# Patient Record
Sex: MALE | Race: ASIAN | Employment: FULL TIME | URBAN - METROPOLITAN AREA
[De-identification: names, ages, dates, MRNs, and addresses within clinical notes are randomized per-mention and may not be internally consistent; named-entity substitution may affect disease eponyms.]

---

## 2017-10-13 ENCOUNTER — ANESTHESIA EVENT (OUTPATIENT)
Dept: ENDOSCOPY | Age: 27
DRG: 378 | End: 2017-10-13
Payer: COMMERCIAL

## 2017-10-13 ENCOUNTER — HOSPITAL ENCOUNTER (INPATIENT)
Age: 27
LOS: 2 days | Discharge: HOME | DRG: 378 | End: 2017-10-15
Attending: EMERGENCY MEDICINE | Admitting: HOSPITALIST
Payer: COMMERCIAL

## 2017-10-13 ENCOUNTER — APPOINTMENT (OUTPATIENT)
Dept: GENERAL RADIOLOGY | Age: 27
DRG: 378 | End: 2017-10-13
Attending: EMERGENCY MEDICINE
Payer: COMMERCIAL

## 2017-10-13 ENCOUNTER — ANESTHESIA (OUTPATIENT)
Dept: ENDOSCOPY | Age: 27
DRG: 378 | End: 2017-10-13
Payer: COMMERCIAL

## 2017-10-13 DIAGNOSIS — K92.2 UPPER GI BLEED: Primary | ICD-10-CM

## 2017-10-13 PROBLEM — I10 HYPERTENSION: Status: ACTIVE | Noted: 2017-10-13

## 2017-10-13 PROBLEM — F99 PSYCHIATRIC ILLNESS: Status: ACTIVE | Noted: 2017-10-13

## 2017-10-13 LAB
ABO + RH BLD: NORMAL
ALBUMIN SERPL-MCNC: 4 G/DL (ref 3.4–5)
ALBUMIN/GLOB SERPL: 1.3 {RATIO} (ref 0.8–1.7)
ALP SERPL-CCNC: 65 U/L (ref 45–117)
ALT SERPL-CCNC: 57 U/L (ref 16–61)
ANION GAP BLD CALC-SCNC: 19 MMOL/L (ref 10–20)
ANION GAP SERPL CALC-SCNC: 11 MMOL/L (ref 3–18)
APTT PPP: 26.6 SEC (ref 23–36.4)
AST SERPL-CCNC: 15 U/L (ref 15–37)
BASOPHILS # BLD: 0.1 K/UL (ref 0–0.06)
BASOPHILS NFR BLD: 0 % (ref 0–2)
BILIRUB SERPL-MCNC: 0.6 MG/DL (ref 0.2–1)
BLOOD GROUP ANTIBODIES SERPL: NORMAL
BUN BLD-MCNC: 31 MG/DL (ref 7–18)
BUN SERPL-MCNC: 29 MG/DL (ref 7–18)
BUN/CREAT SERPL: 25 (ref 12–20)
CA-I BLD-MCNC: 1.12 MMOL/L (ref 1.12–1.32)
CALCIUM SERPL-MCNC: 8.5 MG/DL (ref 8.5–10.1)
CHLORIDE BLD-SCNC: 103 MMOL/L (ref 100–108)
CHLORIDE SERPL-SCNC: 102 MMOL/L (ref 100–108)
CK MB CFR SERPL CALC: NORMAL % (ref 0–4)
CK MB SERPL-MCNC: <1 NG/ML (ref 5–25)
CK SERPL-CCNC: 55 U/L (ref 39–308)
CO2 BLD-SCNC: 24 MMOL/L (ref 19–24)
CO2 SERPL-SCNC: 26 MMOL/L (ref 21–32)
CREAT SERPL-MCNC: 1.16 MG/DL (ref 0.6–1.3)
CREAT UR-MCNC: 1.2 MG/DL (ref 0.6–1.3)
DIFFERENTIAL METHOD BLD: ABNORMAL
EOSINOPHIL # BLD: 0.3 K/UL (ref 0–0.4)
EOSINOPHIL NFR BLD: 2 % (ref 0–5)
ERYTHROCYTE [DISTWIDTH] IN BLOOD BY AUTOMATED COUNT: 12.4 % (ref 11.6–14.5)
GLOBULIN SER CALC-MCNC: 3 G/DL (ref 2–4)
GLUCOSE BLD STRIP.AUTO-MCNC: 107 MG/DL (ref 74–106)
GLUCOSE SERPL-MCNC: 106 MG/DL (ref 74–99)
HCT VFR BLD AUTO: 31.7 % (ref 36–48)
HCT VFR BLD AUTO: 40.2 % (ref 36–48)
HCT VFR BLD CALC: 41 % (ref 36–49)
HGB BLD-MCNC: 10.4 G/DL (ref 13–16)
HGB BLD-MCNC: 13.5 G/DL (ref 13–16)
HGB BLD-MCNC: 13.9 G/DL (ref 12–16)
INR PPP: 1 (ref 0.8–1.2)
LYMPHOCYTES # BLD: 4.4 K/UL (ref 0.9–3.6)
LYMPHOCYTES NFR BLD: 27 % (ref 21–52)
MCH RBC QN AUTO: 29.3 PG (ref 24–34)
MCHC RBC AUTO-ENTMCNC: 33.6 G/DL (ref 31–37)
MCV RBC AUTO: 87.4 FL (ref 74–97)
MONOCYTES # BLD: 0.7 K/UL (ref 0.05–1.2)
MONOCYTES NFR BLD: 4 % (ref 3–10)
NEUTS SEG # BLD: 10.7 K/UL (ref 1.8–8)
NEUTS SEG NFR BLD: 67 % (ref 40–73)
PLATELET # BLD AUTO: 396 K/UL (ref 135–420)
PMV BLD AUTO: 9.5 FL (ref 9.2–11.8)
POTASSIUM BLD-SCNC: 5.4 MMOL/L (ref 3.5–5.5)
POTASSIUM SERPL-SCNC: 5.5 MMOL/L (ref 3.5–5.5)
PROT SERPL-MCNC: 7 G/DL (ref 6.4–8.2)
PROTHROMBIN TIME: 13.1 SEC (ref 11.5–15.2)
RBC # BLD AUTO: 4.6 M/UL (ref 4.7–5.5)
SODIUM BLD-SCNC: 140 MMOL/L (ref 136–145)
SODIUM SERPL-SCNC: 139 MMOL/L (ref 136–145)
SPECIMEN EXP DATE BLD: NORMAL
TROPONIN I SERPL-MCNC: <0.02 NG/ML (ref 0–0.04)
WBC # BLD AUTO: 16.1 K/UL (ref 4.6–13.2)

## 2017-10-13 PROCEDURE — 74011000258 HC RX REV CODE- 258

## 2017-10-13 PROCEDURE — 77030033269 HC SLV COMPR SCD KNE2 CARD -B

## 2017-10-13 PROCEDURE — 77030021678 HC GLIDESCP STAT DISP VERT -B: Performed by: ANESTHESIOLOGY

## 2017-10-13 PROCEDURE — 74011250636 HC RX REV CODE- 250/636

## 2017-10-13 PROCEDURE — 85730 THROMBOPLASTIN TIME PARTIAL: CPT | Performed by: EMERGENCY MEDICINE

## 2017-10-13 PROCEDURE — 85018 HEMOGLOBIN: CPT | Performed by: HOSPITALIST

## 2017-10-13 PROCEDURE — 36415 COLL VENOUS BLD VENIPUNCTURE: CPT | Performed by: HOSPITALIST

## 2017-10-13 PROCEDURE — 76060000032 HC ANESTHESIA 0.5 TO 1 HR: Performed by: INTERNAL MEDICINE

## 2017-10-13 PROCEDURE — 99285 EMERGENCY DEPT VISIT HI MDM: CPT

## 2017-10-13 PROCEDURE — 74011250636 HC RX REV CODE- 250/636: Performed by: HOSPITALIST

## 2017-10-13 PROCEDURE — 86900 BLOOD TYPING SEROLOGIC ABO: CPT | Performed by: EMERGENCY MEDICINE

## 2017-10-13 PROCEDURE — 80053 COMPREHEN METABOLIC PANEL: CPT | Performed by: EMERGENCY MEDICINE

## 2017-10-13 PROCEDURE — 77030008683 HC TU ET CUF COVD -A: Performed by: ANESTHESIOLOGY

## 2017-10-13 PROCEDURE — 74011250636 HC RX REV CODE- 250/636: Performed by: EMERGENCY MEDICINE

## 2017-10-13 PROCEDURE — 96365 THER/PROPH/DIAG IV INF INIT: CPT

## 2017-10-13 PROCEDURE — 85610 PROTHROMBIN TIME: CPT | Performed by: EMERGENCY MEDICINE

## 2017-10-13 PROCEDURE — 76040000007: Performed by: INTERNAL MEDICINE

## 2017-10-13 PROCEDURE — 71010 XR CHEST PORT: CPT

## 2017-10-13 PROCEDURE — 0W3P8ZZ CONTROL BLEEDING IN GASTROINTESTINAL TRACT, VIA NATURAL OR ARTIFICIAL OPENING ENDOSCOPIC: ICD-10-PCS | Performed by: INTERNAL MEDICINE

## 2017-10-13 PROCEDURE — C9113 INJ PANTOPRAZOLE SODIUM, VIA: HCPCS | Performed by: EMERGENCY MEDICINE

## 2017-10-13 PROCEDURE — 77030010936 HC CLP LIG BSC -C: Performed by: INTERNAL MEDICINE

## 2017-10-13 PROCEDURE — 85025 COMPLETE CBC W/AUTO DIFF WBC: CPT | Performed by: EMERGENCY MEDICINE

## 2017-10-13 PROCEDURE — 80047 BASIC METABLC PNL IONIZED CA: CPT

## 2017-10-13 PROCEDURE — 65660000001 HC RM ICU INTERMED STEPDOWN

## 2017-10-13 PROCEDURE — 82550 ASSAY OF CK (CPK): CPT | Performed by: EMERGENCY MEDICINE

## 2017-10-13 PROCEDURE — 96375 TX/PRO/DX INJ NEW DRUG ADDON: CPT

## 2017-10-13 PROCEDURE — 74011000258 HC RX REV CODE- 258: Performed by: EMERGENCY MEDICINE

## 2017-10-13 PROCEDURE — 74011000250 HC RX REV CODE- 250

## 2017-10-13 RX ORDER — SODIUM CHLORIDE, SODIUM LACTATE, POTASSIUM CHLORIDE, CALCIUM CHLORIDE 600; 310; 30; 20 MG/100ML; MG/100ML; MG/100ML; MG/100ML
50 INJECTION, SOLUTION INTRAVENOUS CONTINUOUS
Status: DISCONTINUED | OUTPATIENT
Start: 2017-10-13 | End: 2017-10-13 | Stop reason: HOSPADM

## 2017-10-13 RX ORDER — ONDANSETRON 2 MG/ML
4 INJECTION INTRAMUSCULAR; INTRAVENOUS
Status: COMPLETED | OUTPATIENT
Start: 2017-10-13 | End: 2017-10-13

## 2017-10-13 RX ORDER — ACETAMINOPHEN 325 MG/1
TABLET ORAL
COMMUNITY

## 2017-10-13 RX ORDER — PROPOFOL 10 MG/ML
INJECTION, EMULSION INTRAVENOUS AS NEEDED
Status: DISCONTINUED | OUTPATIENT
Start: 2017-10-13 | End: 2017-10-13 | Stop reason: HOSPADM

## 2017-10-13 RX ORDER — ONDANSETRON 2 MG/ML
4 INJECTION INTRAMUSCULAR; INTRAVENOUS ONCE
Status: DISCONTINUED | OUTPATIENT
Start: 2017-10-13 | End: 2017-10-13 | Stop reason: HOSPADM

## 2017-10-13 RX ORDER — LIDOCAINE HYDROCHLORIDE 20 MG/ML
INJECTION, SOLUTION EPIDURAL; INFILTRATION; INTRACAUDAL; PERINEURAL AS NEEDED
Status: DISCONTINUED | OUTPATIENT
Start: 2017-10-13 | End: 2017-10-13 | Stop reason: HOSPADM

## 2017-10-13 RX ORDER — FENTANYL CITRATE 50 UG/ML
50 INJECTION, SOLUTION INTRAMUSCULAR; INTRAVENOUS AS NEEDED
Status: DISCONTINUED | OUTPATIENT
Start: 2017-10-13 | End: 2017-10-13 | Stop reason: HOSPADM

## 2017-10-13 RX ORDER — SUCCINYLCHOLINE CHLORIDE 20 MG/ML
INJECTION INTRAMUSCULAR; INTRAVENOUS AS NEEDED
Status: DISCONTINUED | OUTPATIENT
Start: 2017-10-13 | End: 2017-10-13 | Stop reason: HOSPADM

## 2017-10-13 RX ORDER — DEXTROSE, SODIUM CHLORIDE, AND POTASSIUM CHLORIDE 5; .45; .15 G/100ML; G/100ML; G/100ML
150 INJECTION INTRAVENOUS CONTINUOUS
Status: DISCONTINUED | OUTPATIENT
Start: 2017-10-13 | End: 2017-10-14

## 2017-10-13 RX ORDER — FENTANYL CITRATE 50 UG/ML
INJECTION, SOLUTION INTRAMUSCULAR; INTRAVENOUS AS NEEDED
Status: DISCONTINUED | OUTPATIENT
Start: 2017-10-13 | End: 2017-10-13 | Stop reason: HOSPADM

## 2017-10-13 RX ORDER — SODIUM CHLORIDE 9 MG/ML
200 INJECTION, SOLUTION INTRAVENOUS CONTINUOUS
Status: DISCONTINUED | OUTPATIENT
Start: 2017-10-13 | End: 2017-10-14

## 2017-10-13 RX ORDER — ONDANSETRON 2 MG/ML
INJECTION INTRAMUSCULAR; INTRAVENOUS AS NEEDED
Status: DISCONTINUED | OUTPATIENT
Start: 2017-10-13 | End: 2017-10-13 | Stop reason: HOSPADM

## 2017-10-13 RX ORDER — PANTOPRAZOLE SODIUM 40 MG/10ML
80 INJECTION, POWDER, LYOPHILIZED, FOR SOLUTION INTRAVENOUS
Status: COMPLETED | OUTPATIENT
Start: 2017-10-13 | End: 2017-10-13

## 2017-10-13 RX ORDER — LAMOTRIGINE 100 MG/1
TABLET ORAL DAILY
COMMUNITY

## 2017-10-13 RX ORDER — SERTRALINE HYDROCHLORIDE 25 MG/1
TABLET, FILM COATED ORAL DAILY
COMMUNITY

## 2017-10-13 RX ORDER — LORAZEPAM 1 MG/1
TABLET ORAL
COMMUNITY

## 2017-10-13 RX ORDER — VENLAFAXINE HYDROCHLORIDE 75 MG/1
CAPSULE, EXTENDED RELEASE ORAL DAILY
COMMUNITY

## 2017-10-13 RX ORDER — BUPROPION HYDROCHLORIDE 75 MG/1
TABLET ORAL 2 TIMES DAILY
COMMUNITY

## 2017-10-13 RX ORDER — MIDAZOLAM HYDROCHLORIDE 1 MG/ML
INJECTION, SOLUTION INTRAMUSCULAR; INTRAVENOUS AS NEEDED
Status: DISCONTINUED | OUTPATIENT
Start: 2017-10-13 | End: 2017-10-13 | Stop reason: HOSPADM

## 2017-10-13 RX ADMIN — FENTANYL CITRATE 50 MCG: 50 INJECTION, SOLUTION INTRAMUSCULAR; INTRAVENOUS at 15:24

## 2017-10-13 RX ADMIN — SUCCINYLCHOLINE CHLORIDE 100 MG: 20 INJECTION INTRAMUSCULAR; INTRAVENOUS at 15:24

## 2017-10-13 RX ADMIN — LIDOCAINE HYDROCHLORIDE 40 MG: 20 INJECTION, SOLUTION EPIDURAL; INFILTRATION; INTRACAUDAL; PERINEURAL at 15:24

## 2017-10-13 RX ADMIN — MIDAZOLAM HYDROCHLORIDE 1 MG: 1 INJECTION, SOLUTION INTRAMUSCULAR; INTRAVENOUS at 15:24

## 2017-10-13 RX ADMIN — DEXTROSE MONOHYDRATE, SODIUM CHLORIDE, AND POTASSIUM CHLORIDE 150 ML/HR: 50; 4.5; 1.49 INJECTION, SOLUTION INTRAVENOUS at 12:29

## 2017-10-13 RX ADMIN — SODIUM CHLORIDE 2000 ML: 900 INJECTION, SOLUTION INTRAVENOUS at 08:31

## 2017-10-13 RX ADMIN — SODIUM CHLORIDE 200 ML/HR: 900 INJECTION, SOLUTION INTRAVENOUS at 09:14

## 2017-10-13 RX ADMIN — DEXTROSE MONOHYDRATE, SODIUM CHLORIDE, AND POTASSIUM CHLORIDE 150 ML/HR: 50; 4.5; 1.49 INJECTION, SOLUTION INTRAVENOUS at 21:10

## 2017-10-13 RX ADMIN — PROPOFOL 130 MG: 10 INJECTION, EMULSION INTRAVENOUS at 15:24

## 2017-10-13 RX ADMIN — ONDANSETRON 4 MG: 2 INJECTION INTRAMUSCULAR; INTRAVENOUS at 15:35

## 2017-10-13 RX ADMIN — SODIUM CHLORIDE 8 MG/HR: 900 INJECTION INTRAVENOUS at 18:18

## 2017-10-13 RX ADMIN — ONDANSETRON 4 MG: 2 INJECTION INTRAMUSCULAR; INTRAVENOUS at 08:32

## 2017-10-13 RX ADMIN — PANTOPRAZOLE SODIUM 80 MG: 40 INJECTION, POWDER, FOR SOLUTION INTRAVENOUS at 08:32

## 2017-10-13 RX ADMIN — SODIUM CHLORIDE 8 MG/HR: 900 INJECTION INTRAVENOUS at 08:37

## 2017-10-13 NOTE — ED NOTES
Verbal report given to [facility] in ICU, patient in endoscopy at this time

## 2017-10-13 NOTE — CONSULTS
History and Physical    Referring Physician: None    Consult Date: 10/13/2017       Assessment & Recommendations: Active Problems:    GI bleed (10/13/2017)      Upper GI bleeding - Continue H&H checks, NPO, pantoprazole. EGD today. Subjective:     Chief Complaint: Bloody vomiting    History of Present Illness: Kay Nation is a 32 y.o. male who is seen in consultation for blood in the vomitus. The patient presented to the ED with vomiting of blood. He denies alcohol intake, anticoagulation, history of liver disease, prior episodes, NSAIDs. Patient was noted to have an H&H of 13.5 and 40. Patient was started on pantoprazole and IVF fluids. He is NPO. Past Medical History:   Diagnosis Date    Psychiatric disorder     depression, bipolar      No past surgical history on file. No family history on file. Social History   Substance Use Topics    Smoking status: Never Smoker    Smokeless tobacco: Never Used    Alcohol use Not on file       Prior to Admission medications    Medication Sig Start Date End Date Taking? Authorizing Provider   sertraline (ZOLOFT) 25 mg tablet Take  by mouth daily. Yes Tabitha Hammer MD   BUSPIRONE HCL (BUSPAR PO) Take  by mouth. Yes Tabitha Hammer MD   lamoTRIgine (LAMICTAL) 100 mg tablet Take  by mouth daily. Yes Tabitha Hammer MD   venlafaxine-SR (EFFEXOR XR) 75 mg capsule Take  by mouth daily. Yes Tabitha Hammer MD   acetaminophen (TYLENOL) 325 mg tablet Take  by mouth every four (4) hours as needed for Pain. Yes Tabitha Hammer MD   buPROPion (WELLBUTRIN) 75 mg tablet Take  by mouth two (2) times a day. Yes Tabitha Hammer MD   LORazepam (ATIVAN) 1 mg tablet Take  by mouth every four (4) hours as needed for Anxiety. Yes Tabitha Hammer MD     Allergies   Allergen Reactions    Penicillins Rash        Review of Systems:  A detailed 10 organ review of systems is obtained with pertinent positives as listed in the History of Present Illness and Past Medical History.  All others are negative. Objective: Intake and Output:    10/13 0701 - 10/13 1900  In: 0128 [I.V.:2640]  Out: 325 [Urine:325]       Physical Exam:   General: NAD  Skin:  Extremities and face reveal no rashes. No yuan erythema. No telangiectasias on the chest wall. HEENT: Sclerae anicteric. No oral ulcers. No abnormal pigmentation of the lips. The neck is supple. Cardiovascular: Regular rate and rhythm. No murmurs, gallops, or rubs. PMI nondisplaced. Respiratory:  Comfortable breathing with no accessory muscle use. Clear breath sounds with no wheezes, rales, or rhonchi. GI:  Abdomen nondistended, soft, and nontender. Normal active bowel sounds. No enlargement of the liver or spleen. No masses palpable. Rectal:  Deferred  Musculoskeletal:  No pitting edema of the lower legs. Extremities have good range of motion. No costovertebral tenderness. Neurological:  Gross memory appears intact. Patient is alert and oriented. Psychiatric:  Mood appears appropriate with judgement intact. Data Review:   Recent Results (from the past 24 hour(s))   CBC WITH AUTOMATED DIFF    Collection Time: 10/13/17  8:20 AM   Result Value Ref Range    WBC 16.1 (H) 4.6 - 13.2 K/uL    RBC 4.60 (L) 4.70 - 5.50 M/uL    HGB 13.5 13.0 - 16.0 g/dL    HCT 40.2 36.0 - 48.0 %    MCV 87.4 74.0 - 97.0 FL    MCH 29.3 24.0 - 34.0 PG    MCHC 33.6 31.0 - 37.0 g/dL    RDW 12.4 11.6 - 14.5 %    PLATELET 135 719 - 306 K/uL    MPV 9.5 9.2 - 11.8 FL    NEUTROPHILS 67 40 - 73 %    LYMPHOCYTES 27 21 - 52 %    MONOCYTES 4 3 - 10 %    EOSINOPHILS 2 0 - 5 %    BASOPHILS 0 0 - 2 %    ABS. NEUTROPHILS 10.7 (H) 1.8 - 8.0 K/UL    ABS. LYMPHOCYTES 4.4 (H) 0.9 - 3.6 K/UL    ABS. MONOCYTES 0.7 0.05 - 1.2 K/UL    ABS. EOSINOPHILS 0.3 0.0 - 0.4 K/UL    ABS.  BASOPHILS 0.1 (H) 0.0 - 0.06 K/UL    DF AUTOMATED     METABOLIC PANEL, COMPREHENSIVE    Collection Time: 10/13/17  8:20 AM   Result Value Ref Range    Sodium 139 136 - 145 mmol/L    Potassium 5.5 3.5 - 5.5 mmol/L    Chloride 102 100 - 108 mmol/L    CO2 26 21 - 32 mmol/L    Anion gap 11 3.0 - 18 mmol/L    Glucose 106 (H) 74 - 99 mg/dL    BUN 29 (H) 7.0 - 18 MG/DL    Creatinine 1.16 0.6 - 1.3 MG/DL    BUN/Creatinine ratio 25 (H) 12 - 20      GFR est AA >60 >60 ml/min/1.73m2    GFR est non-AA >60 >60 ml/min/1.73m2    Calcium 8.5 8.5 - 10.1 MG/DL    Bilirubin, total 0.6 0.2 - 1.0 MG/DL    ALT (SGPT) 57 16 - 61 U/L    AST (SGOT) 15 15 - 37 U/L    Alk.  phosphatase 65 45 - 117 U/L    Protein, total 7.0 6.4 - 8.2 g/dL    Albumin 4.0 3.4 - 5.0 g/dL    Globulin 3.0 2.0 - 4.0 g/dL    A-G Ratio 1.3 0.8 - 1.7     CARDIAC PANEL,(CK, CKMB & TROPONIN)    Collection Time: 10/13/17  8:20 AM   Result Value Ref Range    CK 55 39 - 308 U/L    CK - MB <1.0 <3.6 ng/ml    CK-MB Index  0.0 - 4.0 %     CALCULATION NOT PERFORMED WHEN RESULT IS BELOW LINEAR LIMIT    Troponin-I, Qt. <0.02 0.0 - 0.045 NG/ML   PROTHROMBIN TIME + INR    Collection Time: 10/13/17  8:20 AM   Result Value Ref Range    Prothrombin time 13.1 11.5 - 15.2 sec    INR 1.0 0.8 - 1.2     PTT    Collection Time: 10/13/17  8:20 AM   Result Value Ref Range    aPTT 26.6 23.0 - 36.4 SEC   TYPE & SCREEN    Collection Time: 10/13/17  8:20 AM   Result Value Ref Range    Crossmatch Expiration 10/16/2017     ABO/Rh(D) Saumya Lara POSITIVE     Antibody screen NEG    POC CHEM8    Collection Time: 10/13/17  8:25 AM   Result Value Ref Range    CO2, POC 24 19 - 24 MMOL/L    Glucose,  (H) 74 - 106 MG/DL    BUN, POC 31 (H) 7 - 18 MG/DL    Creatinine, POC 1.2 0.6 - 1.3 MG/DL    GFRAA, POC >60 >60 ml/min/1.73m2    GFRNA, POC >60 >60 ml/min/1.73m2    Sodium,  136 - 145 MMOL/L    Potassium, POC 5.4 3.5 - 5.5 MMOL/L    Calcium, ionized (POC) 1.12 1.12 - 1.32 MMOL/L    Chloride,  100 - 108 MMOL/L    Anion gap, POC 19 10 - 20      Hematocrit, POC 41 36 - 49 %    Hemoglobin, POC 13.9 12 - 16 G/DL         Signed By: Kevin Palomo MD     October 13, 2017

## 2017-10-13 NOTE — ROUTINE PROCESS
Bedside and Verbal shift change report given to Esther oWo RN (oncoming nurse) by Flor Gupta RN (offgoing nurse).  Report included the following information SBAR, Kardex, Intake/Output, MAR, Recent Results and Cardiac Rhythm SR.

## 2017-10-13 NOTE — MED STUDENT NOTES
HPI: Sarah Rhodes is a 25yo previously healthy male who reports for sudden onset of gross hematemesis this morning. He states that last night, he couldn't sleep, was very hot, and felt somewhat nauseous. He awoke this morning with stomach pain, did not eat breakfast, and tried to use the rest room but could not. He had a near-syncopal episode and then began to have vomiting of bright red blood. He denies loss of consciousness, fever, or recent illness, and he has never had the same pain before. He takes multiple psychiatric medications but has no other medical problems. He endorses regular Tylenol use for frequent headaches, but denies Motrin or Aspirin use. His mother is at bedside and states that they do not even have ibuprofen in the house. There is no family history of PUD or ZES. He denies substance use. PSH: Salivary gland resection 2015  PMH: Depression, bipolar disorder    O:  T: 96.3F  P: 110  BP: 86/54  RR: 17  O2- 93%  General: Alert and oriented, in acute distress. Pale, diaphoretic, with bright red blood visible on feet bilaterally. Cardio: RRR with no murmurs, gallops, or rubs. Pulm: CTA in all fields  Abdomen: +BS, Soft, epigastric tenderness. Extremities: +DP pulses bilaterally    CXR: No acute abnormalities, with no free air visualized  POC Hg/Hct- 40.2/13.5  POC Glucose- 107  WBC- 16.1  CMP- within normal limits    Assessment:   Upper GI hemorrhage with hemorrhagic shock  Fluid resuscitation improved BP to 114/65 and pulse to 86, patient now stable and comfortable    Plan:  Continue IV fluid resuscitation and anti-emetics  Repeat H/H in 1 hour  GI consult for probable endoscopy  Monitor vital signs  *ATTENTION:  This note has been created by a medical student for educational purposes only. Please do not refer to the content of this note for clinical decision-making, billing, or other purposes. Please see attending physicians note to obtain clinical information on this patient. *

## 2017-10-13 NOTE — PROGRESS NOTES
1700-Received pt from PACU via stretcher, no sign of distress, vs stable, A+Ox4, calm and cooperative, denies pain and nausea, will continue to monitor  1800-Mother at bedside, pt seen by Dr. Rickey Hernandez

## 2017-10-13 NOTE — PROGRESS NOTES
Severe GE junction esophagitis with active oozing of blood. Circumferential ulceration. One of the ulcers had active oozing. Proceeded to deploy two clips with control of bleeding. Large amount of blood in the stomach with clots. Would admit for observation. Continue protonix drip. NPO. Serial H&H. Can advance diet in the AM if H&H is stable.

## 2017-10-13 NOTE — ACP (ADVANCE CARE PLANNING)
Patient has designated ___________mom_____________ to participate in his/her discharge plan and to receive any needed information.      Name: Rahel Hough  Address:  Phone [phone]

## 2017-10-13 NOTE — PROGRESS NOTES
attempted to complete  the initial Spiritual Assessment of the patient in bed 3 of the emergency room and offered Pastoral Care  Patient does not have any Nondenominational/cultural needs that will affect patients preferences in health care.  Chaplains will continue to follow and will provide pastoral care on an as needed/requested basis     Chaplain Binu Cope   Board Certified [address] [place] [address]   [phone]

## 2017-10-13 NOTE — PROCEDURES
Endoscopy Procedure Note    Patient: Emily Medrano MRN: [id number]  SSN: [id number]    YOB: 1990  Age: 32 y.o. Sex: male      Date/Time:  10/13/2017 3:50 PM    Esophagogastroduodenoscopy (EGD) Procedure Note    Procedure: Esophagogastroduodenoscopy with control of bleeding    IMPRESSION:   1. Normal proximal and mid esophagus  2. Severe ulceration of the GE junction at 40 cm with active oozing. The ulceration was circumferential. Two endoclips were applied over the actively oozing area with control of bleeding. 3. Large amount of blood in the stomach. Limited exam  4. Large amount of old blood and clots in the duodenum. Limited exam.     RECOMMENDATIONS:  1. Resume regular diet. 2. Continue pantoprazole drip  3. Serial H&H  4. Transfuse PRN    Indication: Hematemesis  :  Davide Infante MD  Assistants: Endoscopy Technician-1: Juan Deng  Endoscopy RN-1: Marshall Aragon RN    Referring Provider:   None  History: The history and physical exam were reviewed and updated. Endoscope: Olympus [id number]-190 diagnostic endoscope  Extent of Exam: second portion of the duodenum  ASA: ASA 1 - Normal health patient  Anethesia/Sedation:  MAC anesthesia Propofol    Description of the procedure: The procedure was discussed with the patient including risks, benefits, alternatives including risks of iv sedation, bleeding, perforation and aspiration. A safety timeout was performed. The patient was placed in the left lateral decubitus position. A bite block was placed. The patient was given incremental doses of intravenous sedation until moderate sedation was achieved. The patients vital signs were monitored at all times including heart rate/rhythm, blood pressure and oxygen saturation. The endoscope was then passed under direct visualization to the second portion of the duodenum. The endoscope was then slowly withdrawn while visualizing the mucosa.   In the stomach a retroflexion was performed and gastric fundus and cardia visualized. The endoscope was then slowly withdrawn. The patient was then transferred to recovery in stable condition. Findings:   1. Normal proximal and mid esophagus  2. Severe ulceration of the GE junction at 40 cm with active oozing. The ulceration was circumferential. Two endoclips were applied over the actively oozing area with control of bleeding. 3. Large amount of blood in the stomach. Limited exam  4. Large amount of old blood and clots in the duodenum. Limited exam.        Specimens: * No specimens in log *            Complications:   None; patient tolerated the procedure well.     EBL:Minimal    Discharge disposition:  Home in the company of  when able to ambulate    Ernst Hernandez MD  October 13, 2017  3:50 PM

## 2017-10-13 NOTE — PROGRESS NOTES
I went down to ER to interview pt but he was being taken away for endoscopy. I told him I would speak to him later.

## 2017-10-13 NOTE — PROGRESS NOTES
[facility] [place]    Reasons for Intervention: Chart reviewed and I talked with Pt's mom. He has [employer]. He does not have a PCP,  refferal to be made. Pt lives with [facility] [place] [id number] and she will drive and participate in DC process. He has not used HH or DMEs. Plan is home on dc.     High Risk Criteria  [x] Yes  []No   Physician Referral  [] Yes  [x]No        Date    Nursing Referral  [] Yes  [x]No        Date    Patient/Family Request  [] Yes  [x]No        Date       Resources:    Medicare  [] Yes  [x]No   Medicaid  [] Yes  [x]No   No Resources  [] Yes  [x]No   Private Insurance  [x] Yes  []No    Name/Phone Number    Other  [] Yes  [x]No        (i.e. Workman's Comp)         Prior Services:    Prior Services  [] Yes  [x]No   Home Health  [] Yes  [x]No   [address]  [] Yes  [x]No        [facility] [address] Program  [] Yes  [x]No       Meals on Wheels  [] Yes  [x]No   Office on Aging  [] Yes  [x]No   Transportation Services  [] Yes  [x]No   Nursing Home  [] Yes  [x]No        Nursing Home Name    [address]  [] Yes  [x]No        [address] Name    Other       Information Source:      Information obtained from  [] Patient  [x] Parent   [] [address]  [] Child  [] Spouse   [] Significant Other/Partner   [] Friend      [] EMS    [] Nursing Home Chart          [] Other:   Chart Review  [x] Yes  []No     Family/Support System:    Patient lives with  [] Alone    [] Spouse   [] Significant Other  [] Children  [] Caretaker   [x] Parent  [] Sibling     [] Other       Other Support System:    Is the patient responsible for care of others  [] Yes  [x]No   Information of person caring for patient on  discharge    Managers financial affairs independently  [x] Yes  []No   If no, explain:      Status Prior to Admission:    Mental Status  [x] Awake  [] Alert  [] Oriented  [] Quiet/Calm [] Lethargic/Sedated   [] Disoriented  [] Restless/Anxious  [] Combative   Personal Care  [] Dependent  [x] Independent Personal Care  [] Requires Assistance   Meal Preparation Ability  [x] Independent   [] Standby Assistance   [] Minimal Assistance   [] Moderate Assistance  [] Maximum Assistance     [] Total Assistance   Chores  [x] Independent with Chores   [] N/A Nursing Home Resident   [] Requires Assistance   Bowel/Bladder  [x] Continent  [] Catheter  [] Incontinent  [] Ostomy Self-Care    [] Urine Diversion Self-Care  [] Maximum Assistance     [] Total Assistance   Number of Persons needed for assistance    DME at home  [] Clerance Edgerton, Sherral Cord  [] Clerance Spray, Straight   [] Commode    [] Bathroom/Grab Bars  [] Hospital Bed  [] Nebulizer  [] Oxygen           [] Raised Toilet Seat  [] Shower Chair  [] Side Rails for Bed   [] Tub Transfer Bench   [] Eduardo Jacobs  [] [place], Standard      [] Other:   Vendor      Treatment Presently Receiving:    Current Treatments  [] Chemotherapy  [] Dialysis  [] Insulin  [] IVAB [x] IVF   [] O2  [] PCA   [] PT   [] RT   [] Tube Feedings   [] Wound Care     Psychosocial Evaluation:    Verbalized Knowledge of Disease Process  [] Patient  []Family   Coping with Disease Process  [] Patient  []Family   Requires Further Counseling Coping with Disease Process  [] Patient  []Family     Identified Projected Needs:    Home Health Aid  [] Yes  [x]No   Transportation  [] Yes  [x]No   Education  [] Yes  [x]No        Specific Education     Financial Counseling  [] Yes  [x]No   Inability to Care for Self/Will Require 24 hour care  [] Yes  [x]No   Pain Management  [] Yes  [x]No   Home Infusion Therapy  [] Yes  [x]No   Oxygen Therapy  [] Yes  [x]No   DME  [] Yes  [x]No   Long Term Care Placement  [] Yes  [x]No   Rehab  [] Yes  [x]No   Physical Therapy  [] Yes  [x]No   Needs Anticipated At This Time  [] Yes  [x]No     Intra-Hospital Referral:    [facility]  [] Yes  [x]No     [x] Yes  []No   Patient Representative  [] Yes  [x]No   Staff for Teaching Needs  [] Yes  [x]No   Specialty Teaching Needs     Diabetic Educator  [] Yes  [x]No   Referral for Diabetic Educator Needed  [] Yes  [x]No  If Yes, place order for Nutritionist or Diabetic Consult     Tentative Discharge Plan:    Home with No Services  [x] Yes  []No   Home with [address]  [] Yes  [x]No        If Yes, specify type    Home Care Program  [] Yes  [x]No        If Yes, specify type    Meals on Wheels  [] Yes  [x]No   Office of Aging  [] Yes  [x]No   NHP  [] Yes  [x]No   Return to the Nursing Home  [] Yes  [x]No   Rehab Therapy  [] Yes  [x]No   Acute Rehab  [] Yes  [x]No   Subacute Rehab  [] Yes  [x]No   Private Care  [] Yes  [x]No   Substance Abuse Referral  [] Yes  [x]No   Transportation  [] Yes  [x]No   Chore Service  [] Yes  [x]No   Inpatient Hospice  [] Yes  [x]No   OP RT  [] Yes  [x] No   OP Hemo  [] Yes  [x] No   OP PT  [] Yes  [x]No   Support Group  [] Yes  [x]No   Reach to Recovery  [] Yes  [x]No   OP Oncology Clinic  [] Yes  [x]No   Clinic Appointment  [] Yes  [x]No   DME  [] Yes  [x]No   Comments    Name of D/C Planner or  Given to Patient or Family Rudi Hudson. Milly Hdz RN   Phone Number         Extension [phone]   Date 10/13/17   Time    If you are discharged home, whom do you designate to participate in your discharge plan and receive any information needed?      Enter name of designee Did not say        Phone # of designee         Address of designee         Updated         Patient refused to designate any           individual

## 2017-10-13 NOTE — PERIOP NOTES
Rec'd care of pt from OR via stretcher. Resp even and unlabored. Attached to monitor. VSS. Endo procedure and anesthesia report acknowledged. Protonix drip infusing at 8mg/hr. Will cont to monitor. [address] to Kalpana Elmore RN regarding pt's admission to 2802. Was informed pt will be going to 2803. Pt's mom escorted to ICU waiting area.

## 2017-10-13 NOTE — ED TRIAGE NOTES
Last night had upper abdominal pain. Wasn't able to sleep last night. Woke this morning to vomiting red blood and clots. Cold diaphoretc. Pale.

## 2017-10-13 NOTE — ED NOTES
Purposeful rounding completed:    Side rails up x 2:  YES  Bed in low position and wheels locked: YES  Call bell within reach: YES  Comfort addressed: YES    Toileting needs addressed: YES  Plan of care reviewed/updated with patient and or family members: YES  IV site assessed: YES  Pain assessed and addressed: YES, 0 asleep

## 2017-10-13 NOTE — H&P
History and Physical    Patient: Shruti Saini               Sex: male          DOA: 10/13/2017       YOB: 1990      Age:  32 y.o.        LOS:  LOS: 1 day        HPI:     Shruti Saini is a 32 y.o. male who presented to the ER with vomiting blood. This began earlier in the day today. He was feeling bad and nauseous with decreased energy. He had some pain in the center of the abdomen. His nausea increased until he vomited a large amount of what appeared to be blood. He went on to do this multiple times. Yesterday he was feeling fine. He has never had this before, but he does take OTC antacids at home. Past Medical History:   Diagnosis Date    Psychiatric disorder     depression, bipolar       Social History:   Tobacco use:  Patient does not use tobacco   Alcohol use:  Patient does not use alcohol   Occupation:  Patient is a  at the [employer]    Family History: Mother has HTN and hypothyroidism   Father has CAD    Review of Systems    Constitutional:  No fever or weight loss  HEENT:  No headache or visual changes  Cardiovascular:  No chest pain or diaphoresis  Respiratory:  No coughing, wheezing, or shortness of breath. GI:  Hematemesis as above with abdominal pain  :  No hematuria or dysuria  Skin:  No rashes or moles  Neuro:  No seizures or syncope  Hematological:  No bruising or bleeding  Endocrine:  No diabetes or thyroid disease    Physical Exam:      Visit Vitals    /56    Pulse 87    Temp 97.8 °F (36.6 °C)    Resp 23    Wt 97.5 kg (215 lb)    SpO2 100%       Physical Exam:    Gen:  No distress, alert  HEENT:  Normal cephalic atraumatic, extra-occular movements are intact. Neck:  Supple, No JVD  Lungs:  Clear bilaterally, no wheeze, no rales, normal effort  Heart:  Regular Rate and Rhythm, normal S1 and S2, no edema  Abdomen:  Soft, non tender, normal bowel sounds, no guarding.   Extremities:  Well perfused, no cyanosis or edema  Neurological: Awake and alert, CN's are intact, normal strength throughout extremities  Skin:  No rashes or moles  Mental Status:  Normal thought process, does not appear anxious    Laboratory Studies:    BMP:   Lab Results   Component Value Date/Time     10/14/2017 03:40 AM    K 4.1 10/14/2017 03:40 AM     (H) 10/14/2017 03:40 AM    CO2 25 10/14/2017 03:40 AM    AGAP 7 10/14/2017 03:40 AM     (H) 10/14/2017 03:40 AM    BUN 22 (H) 10/14/2017 03:40 AM    CREA 0.69 10/14/2017 03:40 AM    GFRAA >60 10/14/2017 03:40 AM    GFRNA >60 10/14/2017 03:40 AM     CBC:   Lab Results   Component Value Date/Time    HGB 10.3 (L) 10/14/2017 03:40 AM    HCT 31.7 (L) 10/14/2017 03:40 AM       Assessment/Plan     Principal Problem:    GI bleed (10/13/2017)    Active Problems:    Hypertension (10/13/2017)      Psychiatric illness (10/13/2017)        PLAN:    Protonix drip begun in ER  Patient has had endoscopy which demonstrates bleeding ulcer. This has been clipped and he can soon begin oral diet. Will resume psychiatric medications tomorrow. Follow H/H, patient is willing to transfuse if needed  Discussed with Mom at the bedside.

## 2017-10-13 NOTE — IP AVS SNAPSHOT
[facility] 
 
 
 [address] [place] [address] 
[phone] Patient: Clifford Mitchell MRN: [id number] ZKS:[id number] Current Discharge Medication List  
  
START taking these medications Dose & Instructions Dispensing Information Comments Morning Noon Evening Bedtime  
 pantoprazole 40 mg tablet Commonly known as:  PROTONIX Your last dose was: Your next dose is:    
   
   
 Dose:  40 mg Take 1 Tab by mouth two (2) times a day. Quantity:  60 Tab Refills:  0 CONTINUE these medications which have NOT CHANGED Dose & Instructions Dispensing Information Comments Morning Noon Evening Bedtime  
 acetaminophen 325 mg tablet Commonly known as:  TYLENOL Your last dose was: Your next dose is: Take  by mouth every four (4) hours as needed for Pain. Refills:  0  
     
   
   
   
  
 ATIVAN 1 mg tablet Generic drug:  LORazepam  
   
Your last dose was: Your next dose is: Take  by mouth every four (4) hours as needed for Anxiety. Refills:  0  
     
   
   
   
  
 BUSPAR PO Your last dose was: Your next dose is: Take  by mouth. Refills:  0  
     
   
   
   
  
 EFFEXOR XR 75 mg capsule Generic drug:  venlafaxine-SR Your last dose was: Your next dose is: Take  by mouth daily. Refills:  0 LaMICtal 100 mg tablet Generic drug:  lamoTRIgine Your last dose was: Your next dose is: Take  by mouth daily. Refills:  0 WELLBUTRIN 75 mg tablet Generic drug:  buPROPion Your last dose was: Your next dose is: Take  by mouth two (2) times a day. Refills:  0  
     
   
   
   
  
 ZOLOFT 25 mg tablet Generic drug:  sertraline Your last dose was: Your next dose is: Take  by mouth daily. Refills:  0 Where to Get Your Medications Information on where to get these meds will be given to you by the nurse or doctor. ! Ask your nurse or doctor about these medications  
  pantoprazole 40 mg tablet

## 2017-10-13 NOTE — ANESTHESIA POSTPROCEDURE EVALUATION
Post-Anesthesia Evaluation and Assessment    Patient: Josefina Burr MRN: [id number]  SSN: [id number]    YOB: 1990  Age: 32 y.o. Sex: male       Cardiovascular Function/Vital Signs  Visit Vitals    /71 (BP 1 Location: Left arm, BP Patient Position: At rest;Supine; Head of bed elevated (Comment degrees))    Pulse 91    Temp 36.8 °C (98.2 °F)    Resp 16    Wt 97.5 kg (215 lb)    SpO2 97%       Patient is status post general anesthesia for Procedure(s):  ESOPHAGOGASTRODUODENOSCOPY (EGD). Nausea/Vomiting: None    Postoperative hydration reviewed and adequate. Pain:  Pain Scale 1: Visual (10/13/17 1553)  Pain Intensity 1: 0 (10/13/17 1553)   Managed    Neurological Status:   Neuro (WDL): Within Defined Limits (10/13/17 1553)   At baseline    Mental Status and Level of Consciousness: Arousable    Pulmonary Status:   O2 Device: Room air (10/13/17 1553)   Adequate oxygenation and airway patent    Complications related to anesthesia: None    Post-anesthesia assessment completed.  No concerns    Signed By: Patricia Masterson MD     October 13, 2017

## 2017-10-13 NOTE — IP AVS SNAPSHOT
Jimmie Sarkar 
 
 
 [address] [place] [address] 
[phone] Patient: Anup Singh MRN: [id number] BHX:[id number]/[birth year]88 You are allergic to the following Allergen Reactions Penicillins Rash Recent Documentation Weight Smoking Status 100 kg Never Smoker Emergency Contacts Name Discharge Info Relation Home Work Mobile iVjay Vasquez DISCHARGE CAREGIVER [3] Mother [14]   [phone] About your hospitalization You were admitted on:  October 13, 2017 You last received care in the:  [address] [place], [place] [address] [address] You were discharged on:  October 15, 2017 Unit phone number:  [phone] Why you were hospitalized Your primary diagnosis was:  Gi Bleed Your diagnoses also included:  Psychiatric Illness, Hypertension Providers Seen During Your Hospitalizations Provider Role Specialty Primary office phone Franck Garsia MD Attending Provider Emergency Medicine [phone] Ama Marcial MD Attending Provider [facility] [phone] Wilman Ruffin MD Attending Provider Internal Medicine [phone] Your Primary Care Physician (PCP) Primary Care Physician Office Phone Office Fax NONE ** None ** ** None ** Follow-up Information Follow up With Details Comments Contact Info None In 1 week Follow up  None ([phone]) Patient stated that they have no PCP Kennedy Rockwell MD Schedule an appointment as soon as possible for a visit in 2 weeks Follow up  [place] [phone] 
[phone] Current Discharge Medication List  
  
START taking these medications Dose & Instructions Dispensing Information Comments Morning Noon Evening Bedtime  
 pantoprazole 40 mg tablet Commonly known as:  PROTONIX Your last dose was: Your next dose is:    
   
   
 Dose:  40 mg Take 1 Tab by mouth two (2) times a day. Quantity:  60 Tab Refills:  0 CONTINUE these medications which have NOT CHANGED Dose & Instructions Dispensing Information Comments Morning Noon Evening Bedtime  
 acetaminophen 325 mg tablet Commonly known as:  TYLENOL Your last dose was: Your next dose is: Take  by mouth every four (4) hours as needed for Pain. Refills:  0  
     
   
   
   
  
 ATIVAN 1 mg tablet Generic drug:  LORazepam  
   
Your last dose was: Your next dose is: Take  by mouth every four (4) hours as needed for Anxiety. Refills:  0  
     
   
   
   
  
 BUSPAR PO Your last dose was: Your next dose is: Take  by mouth. Refills:  0  
     
   
   
   
  
 EFFEXOR XR 75 mg capsule Generic drug:  venlafaxine-SR Your last dose was: Your next dose is: Take  by mouth daily. Refills:  0 LaMICtal 100 mg tablet Generic drug:  lamoTRIgine Your last dose was: Your next dose is: Take  by mouth daily. Refills:  0 WELLBUTRIN 75 mg tablet Generic drug:  buPROPion Your last dose was: Your next dose is: Take  by mouth two (2) times a day. Refills:  0  
     
   
   
   
  
 ZOLOFT 25 mg tablet Generic drug:  sertraline Your last dose was: Your next dose is: Take  by mouth daily. Refills:  0 Where to Get Your Medications Information on where to get these meds will be given to you by the nurse or doctor. ! Ask your nurse or doctor about these medications  
  pantoprazole 40 mg tablet Discharge Instructions Patient armband removed and shredded. DISCHARGE SUMMARY from Nurse The following personal items are in your possession at time of discharge: 
 
Dental Appliances: None Visual Aid: None PATIENT INSTRUCTIONS: 
 
 
F-face looks uneven A-arms unable to move or move unevenly S-speech slurred or non-existent T-time-call 911 as soon as signs and symptoms begin-DO NOT go Back to bed or wait to see if you get better-TIME IS BRAIN. Warning Signs of HEART ATTACK Call 911 if you have these symptoms: 
? Chest discomfort. Most heart attacks involve discomfort in the center of the chest that lasts more than a few minutes, or that goes away and comes back. It can feel like uncomfortable pressure, squeezing, fullness, or pain. ? Discomfort in other areas of the upper body. Symptoms can include pain or discomfort in one or both arms, the back, neck, jaw, or stomach. ? Shortness of breath with or without chest discomfort. ? Other signs may include breaking out in a cold sweat, nausea, or lightheadedness. Don't wait more than five minutes to call [address]! Fast action can save your life. Calling 911 is almost always the fastest way to get lifesaving treatment. Emergency Medical Services staff can begin treatment when they arrive  up to an hour sooner than if someone gets to the hospital by car. The discharge information has been reviewed with the patient. The patient verbalized understanding. Discharge medications reviewed with the patient and appropriate educational materials and side effects teaching were provided. Discharge Instructions Attachments/References PANTOPRAZOLE (BY MOUTH) (ENGLISH) NSAIDS (ENGLISH) Discharge Orders None Introducing [facility]! Andrew Espinal introduces [employer] patient portal. Now you can access parts of your medical record, email your doctor's office, and request medication refills online. 1. In your internet browser, go to [email or web address] 2. Click on the First Time User? Click Here link in the Sign In box. You will see the New Member Sign Up page. 3. Enter your Ecomm[employer] Access Code exactly as it appears below. You will not need to use this code after youve completed the sign-up process. If you do not sign up before the expiration date, you must request a new code. · Ecomm[employer] Access Code: [email or web address] Expires: 1/13/2018 11:55 AM 
 
4. Enter the last four digits of your Social Security Number (xxxx) and Date of Birth (mm/dd/yyyy) as indicated and click Submit. You will be taken to the next sign-up page. 5. Create a Ecommo ID. This will be your Ecommo login ID and cannot be changed, so think of one that is secure and easy to remember. 6. Create a Ecommo password. You can change your password at any time. 7. Enter your Password Reset Question and Answer. This can be used at a later time if you forget your password. 8. Enter your e-mail address. You will receive e-mail notification when new information is available in [address] 9. Click Sign Up. You can now view and download portions of your medical record. 10. Click the Download Summary menu link to download a portable copy of your medical information. If you have questions, please visit the Frequently Asked Questions section of the Ecomm[employer] website. Remember, [employer]o is NOT to be used for urgent needs. For medical emergencies, dial 911. Now available from your iPhone and Android! General Information Please provide this summary of care documentation to your next provider. Patient Signature:  ____________________________________________________________ Date:  ____________________________________________________________  
  
Ricky Hernandez Provider Signature:  ____________________________________________________________ Date:  ____________________________________________________________ More Information Pantoprazole (By mouth) Pantoprazole (pan-TOE-pra-zole) Treats gastroesophageal reflux disease (GERD), a damaged esophagus, and high levels of stomach acid. This medicine is a proton pump inhibitor (PPI). Brand Name(s): Protonix There may be other brand names for this medicine. When This Medicine Should Not Be Used: This medicine is not right for everyone. Do not use it if you had an allergic reaction to pantoprazole or similar medicines. How to Use This Medicine:  
Packet, Tablet, Delayed Release Tablet, Long Acting Tablet · Your doctor will tell you how much medicine to use. Do not use more than directed. Take the medicine at least 30 minutes before a meal. 
· Delayed-release tablet: Swallow the tablet whole. Do not crush, break, or chew it. · Delayed-release packet: ¨ To prepare with applesauce: § Mix the packet contents with 1 teaspoon of applesauce. Do not mix with water, or other liquids or food. Do not divide the packet contents to make smaller doses. § Swallow the mixture within 10 minutes after you mix it. Do not chew or crush the granules. § Sip some water after you take the mixture to make sure you swallow all of the medicine. ¨ To prepare with apple juice: § Mix the packet contents with 1 teaspoon of apple juice in a small cup. Do not divide the packet contents to make smaller doses. § Stir for 5 seconds and drink the mixture immediately. Do not chew or crush the granules. § To make sure you get all of the medicine, add more apple juice to the cup. Drink it immediately. ¨ To prepare for a feeding tube: § Pour the packet contents in a 2-ounce (60 milliliter [mL]) catheter-tip syringe. § Add 10 mL of apple juice to the syringe. Add the mixture to the tube. Gently tap or shake the barrel of the syringe to help empty it. § Add 10 mL of apple juice to the syringe and put it in the tube. Do this at least 2 times. There should be no granules left in the syringe. · This medicine should come with a Medication Guide. Ask your pharmacist for a copy if you do not have one. · Missed dose: Take a dose as soon as you remember. If it is almost time for your next dose, wait until then and take a regular dose. Do not take extra medicine to make up for a missed dose. · Store the medicine in a closed container at room temperature, away from heat, moisture, and direct light. Drugs and Foods to Avoid: Ask your doctor or pharmacist before using any other medicine, including over-the-counter medicines, vitamins, and herbal products. · Some foods and medicines can affect how pantoprazole works. Tell your doctor if you are using any of the following: ¨ Ampicillin, atazanavir, digoxin, erlotinib, ketoconazole, methotrexate, mycophenolate mofetil, nelfinavir ¨ Blood thinner (including warfarin) ¨ Diuretic (water pill) ¨ Iron supplements Warnings While Using This Medicine: · Tell your doctor if you are pregnant or breastfeeding, or if you have liver disease, lupus, or osteoporosis. · This medicine may cause the following problems: ¨ Kidney problems ¨ Low vitamin B12 or magnesium levels ¨ Increased risk of broken bones in the hip, wrist, or spine · This medicine can cause diarrhea. Call your doctor if the diarrhea becomes severe, does not stop, or is bloody. Do not take any medicine to stop diarrhea until you have talked to your doctor. Diarrhea can occur 2 months or more after you stop taking this medicine. · Tell any doctor or dentist who treats you that you are using this medicine. This medicine may affect certain medical test results. · Your doctor will check your progress and the effects of this medicine at regular visits. Keep all appointments. · Keep all medicine out of the reach of children. Never share your medicine with anyone. Possible Side Effects While Using This Medicine:  
Call your doctor right away if you notice any of these side effects: · Allergic reaction: Itching or hives, swelling in your face or hands, swelling or tingling in your mouth or throat, chest tightness, trouble breathing · Blistering, peeling, red skin rash · Fever, joint pain, swelling in your body, unusual weight gain, change in how much or how often you urinate · Joint pain, rash on your cheeks or arms that gets worse in the sun · Seizures, dizziness, uneven heartbeat, muscle cramps or twitching · Severe diarrhea, stomach cramps, fever · Stomach pain, nausea, vomiting, weight loss If you notice other side effects that you think are caused by this medicine, tell your doctor. Call your doctor for medical advice about side effects. You may report side effects to FDA at 2-550-FDA-9644 © 2017 [address] Information is for End User's use only and may not be sold, redistributed or otherwise used for commercial purposes. The above information is an  only. It is not intended as medical advice for individual conditions or treatments. Talk to your doctor, nurse or pharmacist before following any medical regimen to see if it is safe and effective for you. Nonsteroidal Anti-Inflammatory Drugs (NSAIDs): Care Instructions Your Care Instructions Nonsteroidal anti-inflammatory drugs (NSAIDs) relieve pain and fever. You also can use them to reduce swelling and inflammation. Over-the-counter NSAIDs include: · Ibuprofen (Advil, Motrin). · Naproxen (Aleve). Aspirin (Hernando, Bufferin) is also an NSAID. But it doesn't work the same way as these other NSAIDs. Prescription NSAIDs include: · Diclofenac (Voltaren). · Indomethacin (Indocin). · Piroxicam (Feldene). Take NSAIDS exactly as prescribed. Call your doctor if you think you are having a problem with your medicine. If you are taking over-the-counter medicine, read and follow all instructions on the label. Follow-up care is a key part of your treatment and safety. Be sure to make and go to all appointments, and call your doctor if you are having problems. It's also a good idea to know your test results and keep a list of the medicines you take. What should you know about NSAIDs? · Do not use an over-the-counter NSAID for longer than 10 days. Talk to your doctor first. 
· The most common side effects from NSAIDs are stomachaches, heartburn, and nausea. Taking NSAIDs with food may help prevent these problems. · Using NSAIDs may: 
¨ Lead to stomach ulcers or high blood pressure. ¨ Make symptoms of heart failure worse. ¨ Raise the risk of heart attack, stroke, kidney damage, skin reactions, and bleeding in the stomach and intestines. · Your risks are greater if you take NSAIDs at higher doses or for longer than the label says. People who are older than 72 or who have heart, stomach, or intestinal disease have a higher risk for problems. Aspirin Aspirin is not like other NSAIDs. It can help certain people lower their risk of a heart attack or stroke. But taking aspirin isn't right for everyone, because it can cause serious bleeding. Talk to your doctor before you start taking aspirin every day. You and your doctor can decide if aspirin is a good choice for you based on your risk of a heart attack or stroke and your risk of serious bleeding. If you have a low risk of a heart attack or stroke, the benefits of aspirin probably won't outweigh the risk of bleeding. · If you use other NSAIDs a lot, aspirin may not work as well to prevent heart attack and stroke. · If you take aspirin every day for your heart, talk with your doctor before you take other NSAIDs. · Do not give aspirin to anyone younger than 20. It has been linked to Reye syndrome, a serious illness. When should you call for help? Call 911 anytime you think you may need emergency care. For example, call if: 
· You passed out (lost consciousness). · You vomit blood or what looks like coffee grounds. · You pass maroon or very bloody stools. Call your doctor now or seek immediate medical care if: 
· Your stools are black and tarlike or have streaks of blood. Watch closely for changes in your health, and be sure to contact your doctor if you have any problems. Where can you learn more? Go to http://kamar-daisy.info/. Enter A328 in the search box to learn more about \"Nonsteroidal Anti-Inflammatory Drugs (NSAIDs): Care Instructions. \" Current as of: October 14, 2016 Content Version: 11.3 © 2786-4763 [employer]. Care instructions adapted under license by [employer] (which disclaims liability or warranty for this information). If you have questions about a medical condition or this instruction, always ask your healthcare professional. [address] any warranty or liability for your use of this information.

## 2017-10-13 NOTE — ED NOTES
Brought to treatment area with mother in [address] Attending called to bedside. Patient assisted to stretcher. Monitor applied. bp cycling. Pulse ox on. Oxygen applied at 2 l nc. Placed flat .  Tech at bedside for IV and labs

## 2017-10-13 NOTE — ANESTHESIA PREPROCEDURE EVALUATION
Anesthetic History   No history of anesthetic complications            Review of Systems / Medical History  Patient summary reviewed and pertinent labs reviewed    Pulmonary  Within defined limits            Pertinent negatives: No smoker     Neuro/Psych         Psychiatric history     Cardiovascular  Within defined limits                     GI/Hepatic/Renal  Within defined limits              Endo/Other  Within defined limits           Other Findings   Comments:   Risk Factors for Postoperative nausea/vomiting:       History of postoperative nausea/vomiting? NO       Female? NO       Motion sickness? NO       Intended opioid administration for postoperative analgesia? NO      Smoking Abstinence  Current Smoker? NO  Elective Surgery? NO  Seen preoperatively by anesthesiologist or proxy prior to day of surgery? YES  Pt abstained from smoking 24 hours prior to anesthesia?  N/A           Physical Exam    Airway  Mallampati: II  TM Distance: 4 - 6 cm  Neck ROM: normal range of motion   Mouth opening: Normal     Cardiovascular  Regular rate and rhythm,  S1 and S2 normal,  no murmur, click, rub, or gallop             Dental  No notable dental hx       Pulmonary  Breath sounds clear to auscultation               Abdominal  Abdominal exam normal       Other Findings            Anesthetic Plan    ASA: 2, emergent  Anesthesia type: general          Induction: Intravenous  Anesthetic plan and risks discussed with: Patient

## 2017-10-13 NOTE — ED PROVIDER NOTES
HPI Comments: 8:34 AM  Natasha Comment is a 32 y.o. male presents to ED c/o hematemesis onset 1 hr ago. Associated sxs include epigastric pain, pale and clammy skin, diaphoresis, nausea. Pt states that he had constant epigastric pain onset last night, and he had difficulty sleeping with nausea due to the pain. Epigastric pain is 10/10 in intensity. This morning, he woke up and went to the bathroom but had difficult bowel movements due to epigastric pain. Afterwards, he vomitted red blood with clots. PMHx include depression and bipolar. Pt is allergic to penicilin. He does not take blood thinners, but takes a lot of tylenol. Pt does not drink. Pt denies any other sxs or complaints. The history is provided by the patient and a relative. No  was used. Past Medical History:   Diagnosis Date    Psychiatric disorder     depression, bipolar       No past surgical history on file. No family history on file. Social History     Social History    Marital status: SINGLE     Spouse name: N/A    Number of children: N/A    Years of education: N/A     Occupational History    Not on file. Social History Main Topics    Smoking status: Never Smoker    Smokeless tobacco: Never Used    Alcohol use Not on file    Drug use: Not on file    Sexual activity: Not on file     Other Topics Concern    Not on file     Social History Narrative    No narrative on file         ALLERGIES: Penicillins    Review of Systems   Constitutional: Positive for diaphoresis. Gastrointestinal: Positive for abdominal pain (epigastric) and vomiting. All other systems reviewed and are negative. Vitals:    10/13/17 0945 10/13/17 1000 10/13/17 1015 10/13/17 1030   BP: 114/64 115/60 117/59 114/58   Pulse: 79 81 78 76   Resp: 19 21 17 17   Temp:       SpO2: 100% 100% 100% 100%   Weight:                Physical Exam   Constitutional: He is oriented to person, place, and time.  He appears well-developed and well-nourished. No distress. Bright red blood on clothes, legs, and around his mouth. HENT:   Head: Normocephalic and atraumatic. Mouth/Throat: Oropharynx is clear and moist.   Eyes: Conjunctivae and EOM are normal. Pupils are equal, round, and reactive to light. No scleral icterus. Neck: Normal range of motion. Neck supple. Cardiovascular: Regular rhythm and normal heart sounds. Tachycardia present. No murmur heard. Pulmonary/Chest: Effort normal and breath sounds normal. No respiratory distress. Abdominal: Soft. Bowel sounds are normal. He exhibits no distension. There is tenderness (epigastric). Musculoskeletal: He exhibits no edema. Lymphadenopathy:     He has no cervical adenopathy. Neurological: He is alert and oriented to person, place, and time. Coordination normal.   Skin: Skin is warm. No rash noted. He is diaphoretic. There is pallor (and clammy). Psychiatric: He has a normal mood and affect. His behavior is normal.   Nursing note and vitals reviewed.        MDM  Number of Diagnoses or Management Options  Upper GI bleed:   Diagnosis management comments: Upper GI bleed starting this am with hematemesis X 3 epigastric pain since last night arrived hypotensive diaphoretic in ED improved after 2 liter bolus protonix bolus and infusion started zofran given continue fluids 200cc/hr     cxr neg for free air     9:27 AM  /65  Pulse 86  Temp 96.3 °F (35.7 °C)  Resp 15  Wt 97.5 kg (215 lb)  SpO2 100%  Improved on re eval       Discussed with DR Samuel Bai do not require NGT at present will do endoscopy this afternoon        Amount and/or Complexity of Data Reviewed  Clinical lab tests: ordered and reviewed  Tests in the radiology section of CPT®: ordered and reviewed  Independent visualization of images, tracings, or specimens: yes    Risk of Complications, Morbidity, and/or Mortality  Presenting problems: high  Diagnostic procedures: high  Management options: high    Patient Progress  Patient progress: improved    ED Course       Procedures    Vitals:  Patient Vitals for the past 12 hrs:   Temp Pulse Resp BP SpO2   10/13/17 1030 - 76 17 114/58 100 %   10/13/17 1015 - 78 17 117/59 100 %   10/13/17 1000 - 81 21 115/60 100 %   10/13/17 0945 - 79 19 114/64 100 %   10/13/17 0930 - 81 16 105/54 100 %   10/13/17 0915 - 82 17 112/60 100 %   10/13/17 0900 - 86 15 114/65 100 %   10/13/17 0845 - 87 21 107/61 100 %   10/13/17 0830 - 99 17 111/61 100 %   10/13/17 0826 - 87 21 95/55 100 %   10/13/17 0808 - 98 20 98/69 100 %   10/13/17 0802 96.3 °F (35.7 °C) (!) 110 17 (!) 86/54 93 %       Medications Ordered:  Medications   0.9% sodium chloride infusion (200 mL/hr IntraVENous New Bag 10/13/17 0914)   pantoprazole (PROTONIX) 40 mg in 0.9% sodium chloride (MBP/ADV) 50 mL MBP (8 mg/hr IntraVENous New Bag 10/13/17 0837)   sodium chloride 0.9 % bolus infusion 2,000 mL (2,000 mL IntraVENous New Bag 10/13/17 0831)   pantoprazole (PROTONIX) injection 80 mg (80 mg IntraVENous Given 10/13/17 0832)   ondansetron (ZOFRAN) injection 4 mg (4 mg IntraVENous Given 10/13/17 0832)       Lab Findings:  Recent Results (from the past 12 hour(s))   CBC WITH AUTOMATED DIFF    Collection Time: 10/13/17  8:20 AM   Result Value Ref Range    WBC 16.1 (H) 4.6 - 13.2 K/uL    RBC 4.60 (L) 4.70 - 5.50 M/uL    HGB 13.5 13.0 - 16.0 g/dL    HCT 40.2 36.0 - 48.0 %    MCV 87.4 74.0 - 97.0 FL    MCH 29.3 24.0 - 34.0 PG    MCHC 33.6 31.0 - 37.0 g/dL    RDW 12.4 11.6 - 14.5 %    PLATELET 844 780 - 421 K/uL    MPV 9.5 9.2 - 11.8 FL    NEUTROPHILS 67 40 - 73 %    LYMPHOCYTES 27 21 - 52 %    MONOCYTES 4 3 - 10 %    EOSINOPHILS 2 0 - 5 %    BASOPHILS 0 0 - 2 %    ABS. NEUTROPHILS 10.7 (H) 1.8 - 8.0 K/UL    ABS. LYMPHOCYTES 4.4 (H) 0.9 - 3.6 K/UL    ABS. MONOCYTES 0.7 0.05 - 1.2 K/UL    ABS. EOSINOPHILS 0.3 0.0 - 0.4 K/UL    ABS.  BASOPHILS 0.1 (H) 0.0 - 0.06 K/UL    DF AUTOMATED     METABOLIC PANEL, COMPREHENSIVE    Collection Time: 10/13/17 8:20 AM   Result Value Ref Range    Sodium 139 136 - 145 mmol/L    Potassium 5.5 3.5 - 5.5 mmol/L    Chloride 102 100 - 108 mmol/L    CO2 26 21 - 32 mmol/L    Anion gap 11 3.0 - 18 mmol/L    Glucose 106 (H) 74 - 99 mg/dL    BUN 29 (H) 7.0 - 18 MG/DL    Creatinine 1.16 0.6 - 1.3 MG/DL    BUN/Creatinine ratio 25 (H) 12 - 20      GFR est AA >60 >60 ml/min/1.73m2    GFR est non-AA >60 >60 ml/min/1.73m2    Calcium 8.5 8.5 - 10.1 MG/DL    Bilirubin, total 0.6 0.2 - 1.0 MG/DL    ALT (SGPT) 57 16 - 61 U/L    AST (SGOT) 15 15 - 37 U/L    Alk.  phosphatase 65 45 - 117 U/L    Protein, total 7.0 6.4 - 8.2 g/dL    Albumin 4.0 3.4 - 5.0 g/dL    Globulin 3.0 2.0 - 4.0 g/dL    A-G Ratio 1.3 0.8 - 1.7     CARDIAC PANEL,(CK, CKMB & TROPONIN)    Collection Time: 10/13/17  8:20 AM   Result Value Ref Range    CK 55 39 - 308 U/L    CK - MB <1.0 <3.6 ng/ml    CK-MB Index  0.0 - 4.0 %     CALCULATION NOT PERFORMED WHEN RESULT IS BELOW LINEAR LIMIT    Troponin-I, Qt. <0.02 0.0 - 0.045 NG/ML   PROTHROMBIN TIME + INR    Collection Time: 10/13/17  8:20 AM   Result Value Ref Range    Prothrombin time 13.1 11.5 - 15.2 sec    INR 1.0 0.8 - 1.2     PTT    Collection Time: 10/13/17  8:20 AM   Result Value Ref Range    aPTT 26.6 23.0 - 36.4 SEC   TYPE & SCREEN    Collection Time: 10/13/17  8:20 AM   Result Value Ref Range    Crossmatch Expiration 10/16/2017     ABO/Rh(D) Cathie Vega POSITIVE     Antibody screen NEG    POC CHEM8    Collection Time: 10/13/17  8:25 AM   Result Value Ref Range    CO2, POC 24 19 - 24 MMOL/L    Glucose,  (H) 74 - 106 MG/DL    BUN, POC 31 (H) 7 - 18 MG/DL    Creatinine, POC 1.2 0.6 - 1.3 MG/DL    GFRAA, POC >60 >60 ml/min/1.73m2    GFRNA, POC >60 >60 ml/min/1.73m2    Sodium,  136 - 145 MMOL/L    Potassium, POC 5.4 3.5 - 5.5 MMOL/L    Calcium, ionized (POC) 1.12 1.12 - 1.32 MMOL/L    Chloride,  100 - 108 MMOL/L    Anion gap, POC 19 10 - 20      Hematocrit, POC 41 36 - 49 %    Hemoglobin, POC 13.9 12 - 16 G/DL X-ray, CT or radiology findings or impressions:  XR CHEST PORT   Final Result      8:53 AM  RADIOLOGY FINDINGS  CHEST PORT X-ray shows:  Impression:  Rotated projection of the chest, without superimposed acute radiographic  abnormality. Interpreted by Radiologist    Progress notes, consult notes, or additional procedure notes:  8:52 AM  Patient now with an 18 gauge angiocath, right basilic, good blood flow, and flushes easily. 9:22 AM Consult: I discussed care with Dr. Carlito Rosario (hospitalist). It was a standard discussion including patient history, chief complaint, available diagnostic results, and predicted treatment course. Dr. Carlito Rosario agrees to admit pt.    10:15 AM Consult: I discussed care with Dr. Cat Pal (gastroenterology). It was a standard discussion including patient history, chief complaint, available diagnostic results, and predicted treatment course. Reevaluation of the patient:  Improved no vomiting Bp improved     Diagnosis:   1. Upper GI bleed        Disposition: Admitted    Follow-up Information     None           Patient's Medications   Start Taking    No medications on file   Continue Taking    ACETAMINOPHEN (TYLENOL) 325 MG TABLET    Take  by mouth every four (4) hours as needed for Pain. BUPROPION (WELLBUTRIN) 75 MG TABLET    Take  by mouth two (2) times a day. BUSPIRONE HCL (BUSPAR PO)    Take  by mouth. LAMOTRIGINE (LAMICTAL) 100 MG TABLET    Take  by mouth daily. LORAZEPAM (ATIVAN) 1 MG TABLET    Take  by mouth every four (4) hours as needed for Anxiety. SERTRALINE (ZOLOFT) 25 MG TABLET    Take  by mouth daily. VENLAFAXINE-SR (EFFEXOR XR) 75 MG CAPSULE    Take  by mouth daily.    These Medications have changed    No medications on file   Stop Taking    No medications on file       Scribe Attestation      Kiersten January acting as a scribe for and in the presence of Dudley Caballero MD  October 13, 2017 at 8:27 AM       Provider Attestation:      I personally performed the services described in the documentation, reviewed the documentation, as recorded by the scribe in my presence, and it accurately and completely records my words and actions.  October 13, 2017 at 8:27 AM - Qian Chavez MD

## 2017-10-14 LAB
ANION GAP SERPL CALC-SCNC: 7 MMOL/L (ref 3–18)
BUN SERPL-MCNC: 22 MG/DL (ref 7–18)
BUN/CREAT SERPL: 32 (ref 12–20)
CALCIUM SERPL-MCNC: 8.2 MG/DL (ref 8.5–10.1)
CHLORIDE SERPL-SCNC: 111 MMOL/L (ref 100–108)
CO2 SERPL-SCNC: 25 MMOL/L (ref 21–32)
CREAT SERPL-MCNC: 0.69 MG/DL (ref 0.6–1.3)
GLUCOSE SERPL-MCNC: 100 MG/DL (ref 74–99)
HCT VFR BLD AUTO: 28.9 % (ref 36–48)
HCT VFR BLD AUTO: 30.1 % (ref 36–48)
HCT VFR BLD AUTO: 31.7 % (ref 36–48)
HGB BLD-MCNC: 10.3 G/DL (ref 13–16)
HGB BLD-MCNC: 9.6 G/DL (ref 13–16)
HGB BLD-MCNC: 9.9 G/DL (ref 13–16)
POTASSIUM SERPL-SCNC: 4.1 MMOL/L (ref 3.5–5.5)
SODIUM SERPL-SCNC: 143 MMOL/L (ref 136–145)

## 2017-10-14 PROCEDURE — C9113 INJ PANTOPRAZOLE SODIUM, VIA: HCPCS | Performed by: EMERGENCY MEDICINE

## 2017-10-14 PROCEDURE — 74011250636 HC RX REV CODE- 250/636: Performed by: HOSPITALIST

## 2017-10-14 PROCEDURE — 65660000000 HC RM CCU STEPDOWN

## 2017-10-14 PROCEDURE — 74011000258 HC RX REV CODE- 258: Performed by: EMERGENCY MEDICINE

## 2017-10-14 PROCEDURE — 36415 COLL VENOUS BLD VENIPUNCTURE: CPT | Performed by: HOSPITALIST

## 2017-10-14 PROCEDURE — 74011250637 HC RX REV CODE- 250/637: Performed by: HOSPITALIST

## 2017-10-14 PROCEDURE — 74011250636 HC RX REV CODE- 250/636: Performed by: EMERGENCY MEDICINE

## 2017-10-14 PROCEDURE — 80048 BASIC METABOLIC PNL TOTAL CA: CPT | Performed by: HOSPITALIST

## 2017-10-14 PROCEDURE — 85018 HEMOGLOBIN: CPT | Performed by: HOSPITALIST

## 2017-10-14 PROCEDURE — 74011250637 HC RX REV CODE- 250/637: Performed by: FAMILY MEDICINE

## 2017-10-14 RX ORDER — BUPROPION HYDROCHLORIDE 75 MG/1
75 TABLET ORAL 2 TIMES DAILY
Status: DISCONTINUED | OUTPATIENT
Start: 2017-10-14 | End: 2017-10-14

## 2017-10-14 RX ORDER — LAMOTRIGINE 25 MG/1
100 TABLET ORAL DAILY
Status: DISCONTINUED | OUTPATIENT
Start: 2017-10-15 | End: 2017-10-14

## 2017-10-14 RX ORDER — BUSPIRONE HYDROCHLORIDE 5 MG/1
15 TABLET ORAL 3 TIMES DAILY
Status: DISCONTINUED | OUTPATIENT
Start: 2017-10-14 | End: 2017-10-15 | Stop reason: HOSPADM

## 2017-10-14 RX ORDER — BUPROPION HYDROCHLORIDE 150 MG/1
300 TABLET ORAL
Status: DISCONTINUED | OUTPATIENT
Start: 2017-10-15 | End: 2017-10-15 | Stop reason: HOSPADM

## 2017-10-14 RX ORDER — LORAZEPAM 1 MG/1
1 TABLET ORAL
Status: DISCONTINUED | OUTPATIENT
Start: 2017-10-14 | End: 2017-10-15 | Stop reason: HOSPADM

## 2017-10-14 RX ORDER — VENLAFAXINE HYDROCHLORIDE 75 MG/1
150 CAPSULE, EXTENDED RELEASE ORAL DAILY
Status: DISCONTINUED | OUTPATIENT
Start: 2017-10-15 | End: 2017-10-15 | Stop reason: HOSPADM

## 2017-10-14 RX ORDER — ACETAMINOPHEN 325 MG/1
650 TABLET ORAL
Status: DISCONTINUED | OUTPATIENT
Start: 2017-10-14 | End: 2017-10-15 | Stop reason: HOSPADM

## 2017-10-14 RX ORDER — SERTRALINE HYDROCHLORIDE 50 MG/1
100 TABLET, FILM COATED ORAL DAILY
Status: DISCONTINUED | OUTPATIENT
Start: 2017-10-15 | End: 2017-10-15 | Stop reason: HOSPADM

## 2017-10-14 RX ADMIN — SODIUM CHLORIDE 8 MG/HR: 900 INJECTION INTRAVENOUS at 21:10

## 2017-10-14 RX ADMIN — SODIUM CHLORIDE 8 MG/HR: 900 INJECTION INTRAVENOUS at 01:24

## 2017-10-14 RX ADMIN — SODIUM CHLORIDE 8 MG/HR: 900 INJECTION INTRAVENOUS at 09:53

## 2017-10-14 RX ADMIN — SODIUM CHLORIDE 8 MG/HR: 900 INJECTION INTRAVENOUS at 05:22

## 2017-10-14 RX ADMIN — BUSPIRONE HYDROCHLORIDE 15 MG: 5 TABLET ORAL at 17:18

## 2017-10-14 RX ADMIN — DEXTROSE MONOHYDRATE, SODIUM CHLORIDE, AND POTASSIUM CHLORIDE 150 ML/HR: 50; 4.5; 1.49 INJECTION, SOLUTION INTRAVENOUS at 05:23

## 2017-10-14 RX ADMIN — ACETAMINOPHEN 650 MG: 325 TABLET, FILM COATED ORAL at 22:17

## 2017-10-14 RX ADMIN — SODIUM CHLORIDE 8 MG/HR: 900 INJECTION INTRAVENOUS at 17:24

## 2017-10-14 RX ADMIN — DEXTROSE MONOHYDRATE, SODIUM CHLORIDE, AND POTASSIUM CHLORIDE 150 ML/HR: 50; 4.5; 1.49 INJECTION, SOLUTION INTRAVENOUS at 09:53

## 2017-10-14 RX ADMIN — BUSPIRONE HYDROCHLORIDE 15 MG: 5 TABLET ORAL at 21:09

## 2017-10-14 NOTE — ROUTINE PROCESS
[address] report from Valentino Massa. Report consisted of situation, background, assessment, and recommendation. 1415 Pt arrived to floor. Telemetry monitor connected, pt oriented to room, pt is stable with no complaints of pain. Will continue to monitor. 1610 Pt resting in bed with no complaints of pain and no change to condition. Will continue to monitor. 1830 Pt resting in bed with no complaints of pain and no change to condition. Will continue to monitor. Pt's mother at bedside. Bedside and Verbal shift change report given to Urbano CHIN (oncoming nurse) by [facility] Dayana (offgoing nurse).  Report included the following information SBAR, Kardex, Intake/Output, MAR, Recent Results and Cardiac Rhythm SR.

## 2017-10-14 NOTE — ROUTINE PROCESS
TRANSFER - OUT REPORT:    Verbal report given to [address] [facility] RN(name) on [employer]  being transferred to [facility](unit) for routine progression of care       Report consisted of patients Situation, Background, Assessment and   Recommendations(SBAR). Information from the following report(s) SBAR, Kardex, Intake/Output, MAR, Recent Results and Cardiac Rhythm SR was reviewed with the receiving nurse. Lines:   Peripheral IV 10/13/17 Left Hand (Active)   Site Assessment Clean, dry, & intact 10/14/2017 12:00 PM   Phlebitis Assessment 0 10/14/2017 12:00 PM   Infiltration Assessment 0 10/14/2017 12:00 PM   Dressing Status Clean, dry, & intact 10/14/2017 12:00 PM   Dressing Type Transparent;Tape 10/14/2017  8:00 AM   Hub Color/Line Status Pink 10/14/2017  8:00 AM   Action Taken Open ports on tubing capped 10/14/2017  8:00 AM   Alcohol Cap Used Yes 10/14/2017  8:00 AM       Peripheral IV 10/13/17 Right Antecubital (Active)   Site Assessment Clean, dry, & intact 10/14/2017 12:00 PM   Phlebitis Assessment 0 10/14/2017 12:00 PM   Infiltration Assessment 0 10/14/2017 12:00 PM   Dressing Status Clean, dry, & intact 10/14/2017 12:00 PM   Dressing Type Transparent;Tape 10/14/2017  8:00 AM   Hub Color/Line Status Green 10/14/2017  8:00 AM   Alcohol Cap Used Yes 10/14/2017  8:00 AM        Opportunity for questions and clarification was provided.       Patient transported with:   Pt chart  Pt belongings

## 2017-10-14 NOTE — PROGRESS NOTES
Progress Note      Patient: Ute Ma               Sex: male          DOA: 10/13/2017       YOB: 1990      Age:  32 y.o.        LOS:  LOS: 1 day             CHIEF COMPLAINT:  GI Bleeding with Gastric ulcer, clipped    Subjective:     Patient is smiling  He feels better  No pain    Objective:      Visit Vitals    /71    Pulse 82    Temp 98.7 °F (37.1 °C)    Resp 16    Wt 97.5 kg (215 lb)    SpO2 100%       Physical Exam:  Gen:  Patient is in no distress. No complaint  HEENT and Neck:  PERRL, No cervical tenderness or masses  Lungs:  Clear bilaterally. No rales, no wheeze. Normal effort. Heart:  Regular Rate and Rhythm. No murmur or gallop. No JVD. No edema. Abdomen:  Soft, non tender, no masses, no Hepatosplenomegally  Extremities:  No digital clubbing, no cyanosis  Neuro:  Alert and oriented, Normal affect, Cranial nerves intact, No sensory deficits        Lab/Data Reviewed:  BMP:   Lab Results   Component Value Date/Time     10/14/2017 03:40 AM    K 4.1 10/14/2017 03:40 AM     (H) 10/14/2017 03:40 AM    CO2 25 10/14/2017 03:40 AM    AGAP 7 10/14/2017 03:40 AM     (H) 10/14/2017 03:40 AM    BUN 22 (H) 10/14/2017 03:40 AM    CREA 0.69 10/14/2017 03:40 AM    GFRAA >60 10/14/2017 03:40 AM    GFRNA >60 10/14/2017 03:40 AM     CBC:   Lab Results   Component Value Date/Time    HGB 9.9 (L) 10/14/2017 11:41 AM    HCT 30.1 (L) 10/14/2017 11:41 AM           Assessment/Plan     Principal Problem:    GI bleed (10/13/2017)    Active Problems:    Hypertension (10/13/2017)      Psychiatric illness (10/13/2017)        Plan:  Following H/H on PPI  Soft diet begun  Home medications begun  Discussed with patient and mother, I think he can come out of Stepdown at this ponit.

## 2017-10-14 NOTE — ROUTINE PROCESS
1900 Bedside and Verbal shift change report received from Cathy Guy RN (offgoing nurse) given to Lora Kingston RN(oncoming nurse). Report consist of SBAR,KARDEX,TELE,I/O,MAR  2000 Assisted pt to bsc. Pt states that he has to strain to get the urine out of his bladder. States that this has been going on for a long time. HR elevated while squating on bsc to urinate. 350 clear yellow urine noted. 2030 phlebotomy at bedside to draw H&H/Pt tolerated well. 2100 Pt assisted to bsc/400cc clear yellow urine noted. 2300 Pt assisted to bsc 600/clear yellow urine noted. 0130 Pt assisted to bsc 450/clear yellow urine noted. 0400 Assisted pt to bsc 300/ml of clear yellow urine noted/ Pt declined bath at this time stated that he would do it himself later in the day. 0530 Assisted pt to bsc 250ml of clear yellow urine noted. 2091  Report given to Celia Pendleton RN(oncoming nurse) by Lora Kingston RN(offgoing nurse).  Report consist of SBAR,MAR,KARDEX,TELE,I/O, CRITICAL K+2.9

## 2017-10-14 NOTE — PROGRESS NOTES
PROGRESS NOTE   PATIENT:  Juma Reyes           MRN: [id number]           [facility], [address]           10/14/2017, 10:45 AM      I  Mr. Melodie Mendez is a 32 y.o. male who is being seen for GI bleed     SUBJECTIVE:  Pt has no symptoms. No melena, blood in stool. Denies abdominal pain.      OBJECTIVE:  Patient Vitals for the past 24 hrs:   Temp Pulse Resp BP SpO2   10/14/17 0800 97.8 °F (36.6 °C) - - - -   10/14/17 0500 - 87 23 116/56 100 %   10/14/17 0400 98.5 °F (36.9 °C) 88 19 112/72 100 %   10/14/17 0306 - 92 17 98/56 98 %   10/14/17 0205 - (!) 144 24 113/64 100 %   10/14/17 0125 - 80 22 117/61 100 %   10/14/17 0000 98.7 °F (37.1 °C) - - - -   10/13/17 2300 - 86 23 125/82 100 %   10/13/17 2200 - (!) 103 27 119/65 99 %   10/13/17 2100 - 91 17 105/56 100 %   10/13/17 2000 98.6 °F (37 °C) (!) 101 16 110/58 99 %   10/13/17 1900 - 87 14 105/65 99 %   10/13/17 1830 - 91 18 98/75 99 %   10/13/17 1815 - 93 17 118/66 98 %   10/13/17 1800 - (!) 142 15 - 100 %   10/13/17 1745 - 98 18 97/53 98 %   10/13/17 1730 - 93 20 98/47 99 %   10/13/17 1719 98.5 °F (36.9 °C) - - - -   10/13/17 1715 - 98 16 99/52 97 %   10/13/17 1700 98.5 °F (36.9 °C) 89 17 106/58 97 %   10/13/17 1626 - 93 16 - 97 %   10/13/17 1623 - 94 17 122/71 96 %   10/13/17 1618 - 95 16 111/68 96 %   10/13/17 1613 - 95 19 125/54 97 %   10/13/17 1608 - 92 20 117/66 93 %   10/13/17 1603 - 91 13 113/70 95 %   10/13/17 1558 - 92 15 118/65 97 %   10/13/17 1555 - 92 11 - 97 %   10/13/17 1553 98.2 °F (36.8 °C) 91 16 121/71 (!) 85 %   10/13/17 1430 - 90 20 99/64 96 %   10/13/17 1415 - 91 19 (!) 83/67 97 %   10/13/17 1400 - (!) 102 24 117/54 99 %   10/13/17 1345 - 92 15 114/61 98 %   10/13/17 1330 - (!) 102 12 112/69 99 %   10/13/17 1315 - 98 21 126/69 99 %   10/13/17 1300 - 91 18 111/55 100 %   10/13/17 1245 - 87 14 117/71 100 %   10/13/17 1230 - 89 16 107/63 100 %   10/13/17 1215 - 83 21 118/63 99 %   10/13/17 1200 - 76 20 109/66 99 %   10/13/17 1145 - 88 19 (!) 65/49 99 %   10/13/17 1130 - 77 19 101/53 100 %   10/13/17 1115 - 81 15 106/61 100 %   10/13/17 1100 - 82 15 112/54 100 %         Intake/Output Summary (Last 24 hours) at 10/14/17 1045  Last data filed at 10/14/17 1000   Gross per 24 hour   Intake             4060 ml   Output             3025 ml   Net             1035 ml       Gen: NAD  Heent: No pallor, icterus   Abd  : Soft, non tender, BS +, No masses felt. Labs: Results:   Chemistry Recent Labs      10/14/17   0340  10/13/17   0820   GLU  100*  106*   NA  143  139   K  4.1  5.5   CL  111*  102   CO2  25  26   BUN  22*  29*   CREA  0.69  1.16   CA  8.2*  8.5   AGAP  7  11   BUCR  32*  25*   AP   --   65   TP   --   7.0   ALB   --   4.0   GLOB   --   3.0   AGRAT   --   1.3    CrCl cannot be calculated (Unknown ideal weight.). CBC w/Diff Recent Labs      10/14/17   0340  10/13/17   2030  10/13/17   0820   WBC   --    --   16.1*   RBC   --    --   4.60*   HGB  10.3*  10.4*  13.5   HCT  31.7*  31.7*  40.2   PLT   --    --   396   GRANS   --    --   67   LYMPH   --    --   27   EOS   --    --   2      Cardiac Enzymes Recent Labs      10/13/17   0820   CPK  55   CKND1  CALCULATION NOT PERFORMED WHEN RESULT IS BELOW LINEAR LIMIT      Coagulation Recent Labs      10/13/17   0820   PTP  13.1   INR  1.0   APTT  26.6       Hepatitis Panel No results found for: HAMAT, HAAB, HABT, HAAT, HBSAG, HBSB, HBSAT, HBABN, HBCM, HBCAB, HBCAT, XBCABS, HBEAB, HBEAG, XHEPCS, 130566, HBEGLT, HBCMLT, HBCLT, HBEBLT, [id number], [id number], HAVMLT, [id number], HBCMLT, [id number], HCGAT   Amylase Lipase    Liver Enzymes Recent Labs      10/13/17   0820   TP  7.0   ALB  4.0   TBILI  0.6   AP  65   SGOT  15   ALT  57      Thyroid Studies No results for input(s): T4, T3U, TSH, TSHEXT in the last 72 hours.     No lab exists for component: T3RU     Pathology pathology         Allergies   Allergen Reactions    Penicillins Rash       Current Facility-Administered Medications   Medication Dose Route Frequency    0.9% sodium chloride infusion  200 mL/hr IntraVENous CONTINUOUS    pantoprazole (PROTONIX) 40 mg in 0.9% sodium chloride (MBP/ADV) 50 mL MBP  8 mg/hr IntraVENous CONTINUOUS    dextrose 5% - 0.45% NaCl with KCl 20 mEq/L infusion  150 mL/hr IntraVENous CONTINUOUS       ASSESSMENT:  1. GI bleed secondary to ulcer at GE junction S/P clipping. No active bleeding at this time  2. Anemia secondary to acute blood loss     RECOMMENDATIONS:   1. Cont Protonix drip for 48 hrs. Change to PO tomorrow  2. Soft diet today  3. Can restart his out pt meds  4.  Avoid NSAID's       Colton Caputo MD

## 2017-10-14 NOTE — PROGRESS NOTES
0730-Received pt resting in bed with eyes closed, no sign of distress, vs stable, will continue to monitor  1000-Resting in bed with eyes closed, family at bedside  1200-Pt seen by GI, new orders received

## 2017-10-14 NOTE — PROGRESS NOTES
Problem: Falls - Risk of  Goal: *Absence of Falls  Document Sea Fall Risk and appropriate interventions in the flowsheet.    Outcome: Progressing Towards Goal  Fall Risk Interventions:  Mobility Interventions: Patient to call before getting OOB           Medication Interventions: Patient to call before getting OOB     Elimination Interventions: Call light in reach

## 2017-10-15 VITALS
DIASTOLIC BLOOD PRESSURE: 87 MMHG | SYSTOLIC BLOOD PRESSURE: 136 MMHG | TEMPERATURE: 98.7 F | HEART RATE: 105 BPM | RESPIRATION RATE: 20 BRPM | WEIGHT: 220.5 LBS | OXYGEN SATURATION: 99 %

## 2017-10-15 LAB
BASOPHILS # BLD: 0 K/UL (ref 0–0.06)
BASOPHILS NFR BLD: 0 % (ref 0–2)
DIFFERENTIAL METHOD BLD: ABNORMAL
EOSINOPHIL # BLD: 0.1 K/UL (ref 0–0.4)
EOSINOPHIL NFR BLD: 2 % (ref 0–5)
ERYTHROCYTE [DISTWIDTH] IN BLOOD BY AUTOMATED COUNT: 12.6 % (ref 11.6–14.5)
HCT VFR BLD AUTO: 27.6 % (ref 36–48)
HGB BLD-MCNC: 9.3 G/DL (ref 13–16)
LYMPHOCYTES # BLD: 3 K/UL (ref 0.9–3.6)
LYMPHOCYTES NFR BLD: 46 % (ref 21–52)
MCH RBC QN AUTO: 29.2 PG (ref 24–34)
MCHC RBC AUTO-ENTMCNC: 33.7 G/DL (ref 31–37)
MCV RBC AUTO: 86.8 FL (ref 74–97)
MONOCYTES # BLD: 0.4 K/UL (ref 0.05–1.2)
MONOCYTES NFR BLD: 6 % (ref 3–10)
NEUTS SEG # BLD: 3.1 K/UL (ref 1.8–8)
NEUTS SEG NFR BLD: 46 % (ref 40–73)
PLATELET # BLD AUTO: 251 K/UL (ref 135–420)
PMV BLD AUTO: 8.8 FL (ref 9.2–11.8)
RBC # BLD AUTO: 3.18 M/UL (ref 4.7–5.5)
WBC # BLD AUTO: 6.6 K/UL (ref 4.6–13.2)

## 2017-10-15 PROCEDURE — 36415 COLL VENOUS BLD VENIPUNCTURE: CPT | Performed by: HOSPITALIST

## 2017-10-15 PROCEDURE — 74011250636 HC RX REV CODE- 250/636: Performed by: EMERGENCY MEDICINE

## 2017-10-15 PROCEDURE — 74011250637 HC RX REV CODE- 250/637: Performed by: HOSPITALIST

## 2017-10-15 PROCEDURE — 74011000258 HC RX REV CODE- 258: Performed by: EMERGENCY MEDICINE

## 2017-10-15 PROCEDURE — C9113 INJ PANTOPRAZOLE SODIUM, VIA: HCPCS | Performed by: EMERGENCY MEDICINE

## 2017-10-15 PROCEDURE — 85025 COMPLETE CBC W/AUTO DIFF WBC: CPT | Performed by: HOSPITALIST

## 2017-10-15 RX ORDER — PANTOPRAZOLE SODIUM 40 MG/1
40 TABLET, DELAYED RELEASE ORAL 2 TIMES DAILY
Qty: 60 TAB | Refills: 0 | Status: SHIPPED | OUTPATIENT
Start: 2017-10-15

## 2017-10-15 RX ADMIN — VENLAFAXINE HYDROCHLORIDE 150 MG: 75 CAPSULE, EXTENDED RELEASE ORAL at 08:38

## 2017-10-15 RX ADMIN — SODIUM CHLORIDE 8 MG/HR: 900 INJECTION INTRAVENOUS at 03:25

## 2017-10-15 RX ADMIN — LAMOTRIGINE 250 MG: 100 TABLET ORAL at 08:39

## 2017-10-15 RX ADMIN — BUPROPION HYDROCHLORIDE 300 MG: 150 TABLET, FILM COATED, EXTENDED RELEASE ORAL at 08:37

## 2017-10-15 RX ADMIN — BUSPIRONE HYDROCHLORIDE 15 MG: 5 TABLET ORAL at 08:38

## 2017-10-15 RX ADMIN — SERTRALINE HYDROCHLORIDE 100 MG: 50 TABLET ORAL at 08:38

## 2017-10-15 RX ADMIN — SODIUM CHLORIDE 8 MG/HR: 900 INJECTION INTRAVENOUS at 08:47

## 2017-10-15 NOTE — DISCHARGE INSTRUCTIONS
Patient armband removed and shredded. DISCHARGE SUMMARY from Nurse    The following personal items are in your possession at time of discharge:    Dental Appliances: None  Visual Aid: None                            PATIENT INSTRUCTIONS:    After general anesthesia or intravenous sedation, for 24 hours or while taking prescription Narcotics:  · Limit your activities  · Do not drive and operate hazardous machinery  · Do not make important personal or business decisions  · Do  not drink alcoholic beverages  · If you have not urinated within 8 hours after discharge, please contact your surgeon on call. Report the following to your surgeon:  · Excessive pain, swelling, redness or odor of or around the surgical area  · Temperature over 100.5  · Nausea and vomiting lasting longer than 4 hours or if unable to take medications  · Any signs of decreased circulation or nerve impairment to extremity: change in color, persistent  numbness, tingling, coldness or increase pain  · Any questions        What to do at Home:  Recommended activity: Activity as tolerated. If you experience any of the following symptoms shortness of breath, difficulty breathing, bleeding, bloody stool/ vomiting blood, nausea, vomiting, diarrhea, change in mental status, dizziness, or temperature greater than 100.5 please follow up with your primary care provider or call 911. *  Please give a list of your current medications to your Primary Care Provider. *  Please update this list whenever your medications are discontinued, doses are      changed, or new medications (including over-the-counter products) are added. *  Please carry medication information at all times in case of emergency situations.           These are general instructions for a healthy lifestyle:    No smoking/ No tobacco products/ Avoid exposure to second hand smoke    Surgeon General's Warning:  Quitting smoking now greatly reduces serious risk to your health. Obesity, smoking, and sedentary lifestyle greatly increases your risk for illness    A healthy diet, regular physical exercise & weight monitoring are important for maintaining a healthy lifestyle    You may be retaining fluid if you have a history of heart failure or if you experience any of the following symptoms:  Weight gain of 3 pounds or more overnight or 5 pounds in a week, increased swelling in our hands or feet or shortness of breath while lying flat in bed. Please call your doctor as soon as you notice any of these symptoms; do not wait until your next office visit. Recognize signs and symptoms of STROKE:    F-face looks uneven    A-arms unable to move or move unevenly    S-speech slurred or non-existent    T-time-call 911 as soon as signs and symptoms begin-DO NOT go       Back to bed or wait to see if you get better-TIME IS BRAIN. Warning Signs of HEART ATTACK     Call 911 if you have these symptoms:   Chest discomfort. Most heart attacks involve discomfort in the center of the chest that lasts more than a few minutes, or that goes away and comes back. It can feel like uncomfortable pressure, squeezing, fullness, or pain.  Discomfort in other areas of the upper body. Symptoms can include pain or discomfort in one or both arms, the back, neck, jaw, or stomach.  Shortness of breath with or without chest discomfort.  Other signs may include breaking out in a cold sweat, nausea, or lightheadedness. Don't wait more than five minutes to call 911 - MINUTES MATTER! Fast action can save your life. Calling 911 is almost always the fastest way to get lifesaving treatment. Emergency Medical Services staff can begin treatment when they arrive -- up to an hour sooner than if someone gets to the hospital by car. The discharge information has been reviewed with the patient. The patient verbalized understanding.     Discharge medications reviewed with the patient and appropriate educational materials and side effects teaching were provided.

## 2017-10-15 NOTE — ROUTINE PROCESS
1925: Assumed care. Awake. Resting in bed. Assessment done. On Protonix gtt at 8 mg/hr. Infusing well  to Right AC Denies any pain of discomfort at this time. Call light within reach. Mother at bedside. 2109: Due med given. 2120: Patient c/o headache. No order for pain. Paged Dr. Danica Shipley. Informed. Order obtained for Tylenol. 2217: Medicated for headache. Per patient request. Will continue to monitor. 0030: Sleeping. 0138: No change from previous assessment. 0400: Sleeping. 0545: No change from previous assessment. 9278: Slept good thru night. Needs attended. HR goes up to 150-160's w/ activity occasional.    0725: Bedside and Verbal shift change report given to Obie Farrell RN (oncoming nurse) by me (offgoing nurse). Report included the following information SBAR, Kardex, Intake/Output, MAR and Recent Results.

## 2017-10-15 NOTE — DISCHARGE SUMMARY
Discharge Summary    Patient: Mike Estes               Sex: male          DOA: 10/13/2017         YOB: 1990      Age:  32 y.o.        LOS:  LOS: 2 days                Admit Date: 10/13/2017    Discharge Date: 10/15/2017    Discharge Medications:     Discharge Medication List as of 10/15/2017 11:56 AM      START taking these medications    Details   pantoprazole (PROTONIX) 40 mg tablet Take 1 Tab by mouth two (2) times a day., Print, Disp-60 Tab, R-0         CONTINUE these medications which have NOT CHANGED    Details   sertraline (ZOLOFT) 25 mg tablet Take  by mouth daily. , Historical Med      BUSPIRONE HCL (BUSPAR PO) Take  by mouth., Historical Med      lamoTRIgine (LAMICTAL) 100 mg tablet Take  by mouth daily. , Historical Med      venlafaxine-SR (EFFEXOR XR) 75 mg capsule Take  by mouth daily. , Historical Med      acetaminophen (TYLENOL) 325 mg tablet Take  by mouth every four (4) hours as needed for Pain., Historical Med      buPROPion (WELLBUTRIN) 75 mg tablet Take  by mouth two (2) times a day., Historical Med      LORazepam (ATIVAN) 1 mg tablet Take  by mouth every four (4) hours as needed for Anxiety. , Historical Med             Follow-up: PCP, GI    Discharge Condition: Stable    Activity: Activity as tolerated    Diet: Resume previous diet    Labs:  Labs: Results:       Chemistry Recent Labs      10/14/17   0340  10/13/17   0820   GLU  100*  106*   NA  143  139   K  4.1  5.5   CL  111*  102   CO2  25  26   BUN  22*  29*   CREA  0.69  1.16   CA  8.2*  8.5   AGAP  7  11   BUCR  32*  25*   AP   --   65   TP   --   7.0   ALB   --   4.0   GLOB   --   3.0   AGRAT   --   1.3      CBC w/Diff Recent Labs      10/15/17   0400  10/14/17   2035  10/14/17   1141   10/13/17   0820   WBC  6.6   --    --    --   16.1*   RBC  3.18*   --    --    --   4.60*   HGB  9.3*  9.6*  9.9*   < >  13.5   HCT  27.6*  28.9*  30.1*   < >  40.2   PLT  251   --    --    --   396   GRANS  46   --    --    --   67   LYMPH 46   --    --    --   27   EOS  2   --    --    --   2    < > = values in this interval not displayed. Cardiac Enzymes Recent Labs      10/13/17   0820   CPK  55   CKND1  CALCULATION NOT PERFORMED WHEN RESULT IS BELOW LINEAR LIMIT      Coagulation Recent Labs      10/13/17   0820   PTP  13.1   INR  1.0   APTT  26.6       Lipid Panel No results found for: CHOL, CHOLPOCT, CHOLX, CHLST, CHOLV, 435471, HDL, LDL, LDLC, DLDLP, 053629, VLDLC, VLDL, TGLX, TRIGL, TRIGP, TGLPOCT, CHHD, CHHDX   BNP No results for input(s): BNPP in the last 72 hours. Liver Enzymes Recent Labs      10/13/17   0820   TP  7.0   ALB  4.0   AP  65   SGOT  15      Thyroid Studies No results found for: T4, T3U, TSH, TSHEXT       Imaging:      EGD    IMPRESSION:   1. Normal proximal and mid esophagus  2. Severe ulceration of the GE junction at 40 cm with active oozing. The ulceration was circumferential. Two endoclips were applied over the actively oozing area with control of bleeding. 3. Large amount of blood in the stomach. Limited exam  4. Large amount of old blood and clots in the duodenum. Limited exam.      RECOMMENDATIONS:  1. Resume regular diet. 2. Continue pantoprazole drip  3. Serial H&H  4. Transfuse PRN      Consults: Gastroenterology    Treatment Team: Treatment Team: Consulting Provider: Jasmina Akins MD; Scribe: Anya Cifuentes;  Consulting Provider: Ama Marcial MD; Charge Nurse: Bettye Gómez RN; Care Manager: Bettye Gómez RN; Utilization Review: Chaim Causey    Significant Diagnostic Studies: labs:   Recent Results (from the past 24 hour(s))   HGB & HCT    Collection Time: 10/14/17  8:35 PM   Result Value Ref Range    HGB 9.6 (L) 13.0 - 16.0 g/dL    HCT 28.9 (L) 36.0 - 48.0 %   CBC WITH AUTOMATED DIFF    Collection Time: 10/15/17  4:00 AM   Result Value Ref Range    WBC 6.6 4.6 - 13.2 K/uL    RBC 3.18 (L) 4.70 - 5.50 M/uL    HGB 9.3 (L) 13.0 - 16.0 g/dL    HCT 27.6 (L) 36.0 - 48.0 %    MCV 86.8 74.0 - 97.0 FL    MCH 29.2 24.0 - 34.0 PG    MCHC 33.7 31.0 - 37.0 g/dL    RDW 12.6 11.6 - 14.5 %    PLATELET 884 331 - 433 K/uL    MPV 8.8 (L) 9.2 - 11.8 FL    NEUTROPHILS 46 40 - 73 %    LYMPHOCYTES 46 21 - 52 %    MONOCYTES 6 3 - 10 %    EOSINOPHILS 2 0 - 5 %    BASOPHILS 0 0 - 2 %    ABS. NEUTROPHILS 3.1 1.8 - 8.0 K/UL    ABS. LYMPHOCYTES 3.0 0.9 - 3.6 K/UL    ABS. MONOCYTES 0.4 0.05 - 1.2 K/UL    ABS. EOSINOPHILS 0.1 0.0 - 0.4 K/UL    ABS. BASOPHILS 0.0 0.0 - 0.06 K/UL    DF AUTOMATED           Discharge diagnoses:    Problem List as of 10/15/2017  Date Reviewed: 10/13/2017          Codes Class Noted - Resolved    * (Principal)GI bleed ICD-10-CM: K92.2  ICD-9-CM: 578.9  10/13/2017 - Present        Psychiatric illness ICD-10-CM: F99  ICD-9-CM: 300.9  10/13/2017 - Present        Hypertension ICD-10-CM: I10  ICD-9-CM: 401.9  10/13/2017 - Present            Hospital Course:   Major issues addressed during hospitalization outlined  below. Savanna Jeter is a 32 y.o. male who presented to the ER with vomiting blood. This began earlier in the day today. He was feeling bad and nauseous with decreased energy. He had some pain in the center of the abdomen. His nausea increased until he vomited a large amount of what appeared to be blood. He went on to do this multiple times. Yesterday he was feeling fine. He has never had this before, but he does take OTC antacids at home. Patient underwent EGD as above. IV protonix was continued. Script for oral PPI given at [facility]. Educated on foods/drinks to avoid. He will need pcp and GI f/u. Hb stable at AL.      Moo Jhaveri MD  October 15, 2017        Total time spent 40 minutes

## 2017-10-15 NOTE — ROUTINE PROCESS
Bedside and Verbal shift change report given to Patrick Bradley (oncoming nurse) by Sushila De Jesus (offgoing nurse). Report included the following information SBAR, Kardex, Intake/Output, MAR, Recent Results and Cardiac Rhythm SR.     0930 Pt resting in bed. Complaints of pain have been addressed. Pt is stable. Will continue to monitor. 1145 Pt resting in bed. Complaints of pain have been addressed. Pt is stable. Will continue to monitor. 2[address] [facility] Dr. Reyna Acosta of pt's increased heart rate upon ambulation. Per Dr. Reyna Acosta complete orthostatic blood pressures and if pt is orthopnic infuse 1 Liter Normal Saline bolus. 1215 Completed orthostatic blood pressures (See Flowsheet). Pt blood pressures and heart rate stable. Pt discharged home. Mother is at bedside. Reviewed discharge instructions with pt and his mother. Questions answered and clarifications provided.

## 2018-04-30 ENCOUNTER — HOSPITAL ENCOUNTER (OUTPATIENT)
Dept: LAB | Age: 28
Discharge: HOME | End: 2018-04-30
Payer: COMMERCIAL

## 2018-04-30 LAB
25(OH)D3 SERPL-MCNC: 5.9 NG/ML (ref 30–100)
ALBUMIN SERPL-MCNC: 4.3 G/DL (ref 3.4–5)
ALBUMIN/GLOB SERPL: 1.2 {RATIO} (ref 0.8–1.7)
ALP SERPL-CCNC: 96 U/L (ref 45–117)
ALT SERPL-CCNC: 45 U/L (ref 16–61)
ANION GAP SERPL CALC-SCNC: 8 MMOL/L (ref 3–18)
AST SERPL-CCNC: 21 U/L (ref 15–37)
BASOPHILS # BLD: 0 K/UL (ref 0–0.06)
BASOPHILS NFR BLD: 0 % (ref 0–2)
BILIRUB SERPL-MCNC: 0.3 MG/DL (ref 0.2–1)
BUN SERPL-MCNC: 9 MG/DL (ref 7–18)
BUN/CREAT SERPL: 8 (ref 12–20)
CALCIUM SERPL-MCNC: 9.3 MG/DL (ref 8.5–10.1)
CHLORIDE SERPL-SCNC: 108 MMOL/L (ref 100–108)
CHOLEST SERPL-MCNC: 198 MG/DL
CO2 SERPL-SCNC: 30 MMOL/L (ref 21–32)
CREAT SERPL-MCNC: 1.2 MG/DL (ref 0.6–1.3)
DIFFERENTIAL METHOD BLD: ABNORMAL
EOSINOPHIL # BLD: 0.3 K/UL (ref 0–0.4)
EOSINOPHIL NFR BLD: 3 % (ref 0–5)
ERYTHROCYTE [DISTWIDTH] IN BLOOD BY AUTOMATED COUNT: 14.2 % (ref 11.6–14.5)
GLOBULIN SER CALC-MCNC: 3.6 G/DL (ref 2–4)
GLUCOSE SERPL-MCNC: 89 MG/DL (ref 74–99)
HBA1C MFR BLD: 5.5 % (ref 4.2–5.6)
HCT VFR BLD AUTO: 44.5 % (ref 36–48)
HDLC SERPL-MCNC: 32 MG/DL (ref 40–60)
HDLC SERPL: 6.2 {RATIO} (ref 0–5)
HGB BLD-MCNC: 13.7 G/DL (ref 13–16)
LDLC SERPL CALC-MCNC: 109.4 MG/DL (ref 0–100)
LIPID PROFILE,FLP: ABNORMAL
LYMPHOCYTES # BLD: 3 K/UL (ref 0.9–3.6)
LYMPHOCYTES NFR BLD: 36 % (ref 21–52)
MCH RBC QN AUTO: 25.7 PG (ref 24–34)
MCHC RBC AUTO-ENTMCNC: 30.8 G/DL (ref 31–37)
MCV RBC AUTO: 83.5 FL (ref 74–97)
MONOCYTES # BLD: 0.6 K/UL (ref 0.05–1.2)
MONOCYTES NFR BLD: 7 % (ref 3–10)
NEUTS SEG # BLD: 4.5 K/UL (ref 1.8–8)
NEUTS SEG NFR BLD: 54 % (ref 40–73)
PLATELET # BLD AUTO: 361 K/UL (ref 135–420)
PMV BLD AUTO: 9.4 FL (ref 9.2–11.8)
POTASSIUM SERPL-SCNC: 5 MMOL/L (ref 3.5–5.5)
PROT SERPL-MCNC: 7.9 G/DL (ref 6.4–8.2)
RBC # BLD AUTO: 5.33 M/UL (ref 4.7–5.5)
SODIUM SERPL-SCNC: 146 MMOL/L (ref 136–145)
T3FREE SERPL-MCNC: 3.1 PG/ML (ref 2.18–3.98)
TRIGL SERPL-MCNC: 283 MG/DL (ref ?–150)
TSH SERPL DL<=0.05 MIU/L-ACNC: 0.87 UIU/ML (ref 0.36–3.74)
VIT B12 SERPL-MCNC: 209 PG/ML (ref 211–911)
VLDLC SERPL CALC-MCNC: 56.6 MG/DL
WBC # BLD AUTO: 8.3 K/UL (ref 4.6–13.2)

## 2018-04-30 PROCEDURE — 84443 ASSAY THYROID STIM HORMONE: CPT | Performed by: PSYCHIATRY & NEUROLOGY

## 2018-04-30 PROCEDURE — 82607 VITAMIN B-12: CPT | Performed by: PSYCHIATRY & NEUROLOGY

## 2018-04-30 PROCEDURE — 80061 LIPID PANEL: CPT | Performed by: PSYCHIATRY & NEUROLOGY

## 2018-04-30 PROCEDURE — 36415 COLL VENOUS BLD VENIPUNCTURE: CPT | Performed by: PSYCHIATRY & NEUROLOGY

## 2018-04-30 PROCEDURE — 80053 COMPREHEN METABOLIC PANEL: CPT | Performed by: PSYCHIATRY & NEUROLOGY

## 2018-04-30 PROCEDURE — 83036 HEMOGLOBIN GLYCOSYLATED A1C: CPT | Performed by: PSYCHIATRY & NEUROLOGY

## 2018-04-30 PROCEDURE — 82306 VITAMIN D 25 HYDROXY: CPT | Performed by: PSYCHIATRY & NEUROLOGY

## 2018-04-30 PROCEDURE — 84481 FREE ASSAY (FT-3): CPT | Performed by: PSYCHIATRY & NEUROLOGY

## 2018-04-30 PROCEDURE — 85025 COMPLETE CBC W/AUTO DIFF WBC: CPT | Performed by: PSYCHIATRY & NEUROLOGY

## 2018-07-17 ENCOUNTER — HOSPITAL ENCOUNTER (OUTPATIENT)
Dept: LAB | Age: 28
Discharge: HOME | End: 2018-07-17
Payer: COMMERCIAL

## 2018-07-17 LAB
ALT SERPL-CCNC: 56 U/L (ref 16–61)
AST SERPL-CCNC: 24 U/L (ref 15–37)
TRIGL SERPL-MCNC: 484 MG/DL (ref ?–150)

## 2018-07-17 PROCEDURE — 84450 TRANSFERASE (AST) (SGOT): CPT | Performed by: DERMATOLOGY

## 2018-07-17 PROCEDURE — 36415 COLL VENOUS BLD VENIPUNCTURE: CPT | Performed by: DERMATOLOGY

## 2018-07-17 PROCEDURE — 84460 ALANINE AMINO (ALT) (SGPT): CPT | Performed by: DERMATOLOGY

## 2018-07-17 PROCEDURE — 84478 ASSAY OF TRIGLYCERIDES: CPT | Performed by: DERMATOLOGY

## 2018-10-17 ENCOUNTER — HOSPITAL ENCOUNTER (OUTPATIENT)
Dept: LAB | Age: 28
Discharge: HOME | End: 2018-10-17
Payer: COMMERCIAL

## 2018-10-17 LAB
ALT SERPL-CCNC: 30 U/L (ref 16–61)
AST SERPL-CCNC: 12 U/L (ref 15–37)
TRIGL SERPL-MCNC: 207 MG/DL (ref ?–150)

## 2018-10-17 PROCEDURE — 84460 ALANINE AMINO (ALT) (SGPT): CPT | Performed by: DERMATOLOGY

## 2018-10-17 PROCEDURE — 84478 ASSAY OF TRIGLYCERIDES: CPT | Performed by: DERMATOLOGY

## 2018-10-17 PROCEDURE — 36415 COLL VENOUS BLD VENIPUNCTURE: CPT | Performed by: DERMATOLOGY

## 2018-10-17 PROCEDURE — 84450 TRANSFERASE (AST) (SGOT): CPT | Performed by: DERMATOLOGY

## 2018-11-28 ENCOUNTER — HOSPITAL ENCOUNTER (OUTPATIENT)
Dept: GENERAL RADIOLOGY | Age: 28
Discharge: HOME | End: 2018-11-28
Attending: FAMILY MEDICINE
Payer: COMMERCIAL

## 2018-11-28 DIAGNOSIS — Z01.810 PRE-OPERATIVE CARDIOVASCULAR EXAMINATION: ICD-10-CM

## 2018-11-28 PROCEDURE — 71046 X-RAY EXAM CHEST 2 VIEWS: CPT

## 2020-09-22 ENCOUNTER — HOSPITAL ENCOUNTER (OUTPATIENT)
Dept: LAB | Age: 30
Discharge: HOME | End: 2020-09-22
Payer: COMMERCIAL

## 2020-09-22 LAB
25(OH)D3 SERPL-MCNC: 42.3 NG/ML (ref 30–100)
ALBUMIN SERPL-MCNC: 4.2 G/DL (ref 3.4–5)
ALBUMIN/GLOB SERPL: 1.3 {RATIO} (ref 0.8–1.7)
ALP SERPL-CCNC: 87 U/L (ref 45–117)
ALT SERPL-CCNC: 24 U/L (ref 16–61)
ANION GAP SERPL CALC-SCNC: 5 MMOL/L (ref 3–18)
AST SERPL-CCNC: 12 U/L (ref 10–38)
BASOPHILS # BLD: 0 K/UL (ref 0–0.1)
BASOPHILS NFR BLD: 1 % (ref 0–2)
BILIRUB SERPL-MCNC: 0.7 MG/DL (ref 0.2–1)
BUN SERPL-MCNC: 17 MG/DL (ref 7–18)
BUN/CREAT SERPL: 19 (ref 12–20)
CALCIUM SERPL-MCNC: 9.2 MG/DL (ref 8.5–10.1)
CHLORIDE SERPL-SCNC: 105 MMOL/L (ref 100–111)
CHOLEST SERPL-MCNC: 219 MG/DL
CO2 SERPL-SCNC: 29 MMOL/L (ref 21–32)
CREAT SERPL-MCNC: 0.9 MG/DL (ref 0.6–1.3)
DIFFERENTIAL METHOD BLD: NORMAL
EOSINOPHIL # BLD: 0.2 K/UL (ref 0–0.4)
EOSINOPHIL NFR BLD: 3 % (ref 0–5)
ERYTHROCYTE [DISTWIDTH] IN BLOOD BY AUTOMATED COUNT: 12 % (ref 11.6–14.5)
GLOBULIN SER CALC-MCNC: 3.3 G/DL (ref 2–4)
GLUCOSE SERPL-MCNC: 82 MG/DL (ref 74–99)
HBA1C MFR BLD: 4.8 % (ref 4.2–5.6)
HCT VFR BLD AUTO: 43.2 % (ref 36–48)
HDLC SERPL-MCNC: 41 MG/DL (ref 40–60)
HDLC SERPL: 5.3 {RATIO} (ref 0–5)
HGB BLD-MCNC: 14.7 G/DL (ref 13–16)
LDLC SERPL CALC-MCNC: 141.2 MG/DL (ref 0–100)
LIPID PROFILE,FLP: ABNORMAL
LITHIUM SERPL-SCNC: 0.3 MMOL/L (ref 0.6–1.2)
LYMPHOCYTES # BLD: 2.5 K/UL (ref 0.9–3.6)
LYMPHOCYTES NFR BLD: 38 % (ref 21–52)
MCH RBC QN AUTO: 29.9 PG (ref 24–34)
MCHC RBC AUTO-ENTMCNC: 34 G/DL (ref 31–37)
MCV RBC AUTO: 88 FL (ref 74–97)
MONOCYTES # BLD: 0.4 K/UL (ref 0.05–1.2)
MONOCYTES NFR BLD: 6 % (ref 3–10)
NEUTS SEG # BLD: 3.4 K/UL (ref 1.8–8)
NEUTS SEG NFR BLD: 52 % (ref 40–73)
PLATELET # BLD AUTO: 349 K/UL (ref 135–420)
PMV BLD AUTO: 9.7 FL (ref 9.2–11.8)
POTASSIUM SERPL-SCNC: 4.3 MMOL/L (ref 3.5–5.5)
PROT SERPL-MCNC: 7.5 G/DL (ref 6.4–8.2)
RBC # BLD AUTO: 4.91 M/UL (ref 4.7–5.5)
SODIUM SERPL-SCNC: 139 MMOL/L (ref 136–145)
T3FREE SERPL-MCNC: 2.7 PG/ML (ref 2.18–3.98)
TRIGL SERPL-MCNC: 184 MG/DL (ref ?–150)
TSH SERPL DL<=0.05 MIU/L-ACNC: 1.52 UIU/ML (ref 0.36–3.74)
VLDLC SERPL CALC-MCNC: 36.8 MG/DL
WBC # BLD AUTO: 6.5 K/UL (ref 4.6–13.2)

## 2020-09-22 PROCEDURE — 85025 COMPLETE CBC W/AUTO DIFF WBC: CPT

## 2020-09-22 PROCEDURE — 82306 VITAMIN D 25 HYDROXY: CPT

## 2020-09-22 PROCEDURE — 83036 HEMOGLOBIN GLYCOSYLATED A1C: CPT

## 2020-09-22 PROCEDURE — 84443 ASSAY THYROID STIM HORMONE: CPT

## 2020-09-22 PROCEDURE — 80178 ASSAY OF LITHIUM: CPT

## 2020-09-22 PROCEDURE — 36415 COLL VENOUS BLD VENIPUNCTURE: CPT

## 2020-09-22 PROCEDURE — 80053 COMPREHEN METABOLIC PANEL: CPT

## 2020-09-22 PROCEDURE — 84481 FREE ASSAY (FT-3): CPT

## 2020-09-22 PROCEDURE — 80061 LIPID PANEL: CPT

## 2020-12-21 ENCOUNTER — HOSPITAL ENCOUNTER (OUTPATIENT)
Dept: LAB | Age: 30
Discharge: HOME | End: 2020-12-21
Payer: COMMERCIAL

## 2020-12-21 LAB
ALBUMIN SERPL-MCNC: 4.6 G/DL (ref 3.4–5)
ALBUMIN/GLOB SERPL: 1.4 {RATIO} (ref 0.8–1.7)
ALP SERPL-CCNC: 87 U/L (ref 45–117)
ALT SERPL-CCNC: 28 U/L (ref 16–61)
ANION GAP SERPL CALC-SCNC: 4 MMOL/L (ref 3–18)
AST SERPL-CCNC: 13 U/L (ref 10–38)
BILIRUB SERPL-MCNC: 0.5 MG/DL (ref 0.2–1)
BUN SERPL-MCNC: 13 MG/DL (ref 7–18)
BUN/CREAT SERPL: 13 (ref 12–20)
CALCIUM SERPL-MCNC: 9.4 MG/DL (ref 8.5–10.1)
CHLORIDE SERPL-SCNC: 105 MMOL/L (ref 100–111)
CO2 SERPL-SCNC: 30 MMOL/L (ref 21–32)
CREAT SERPL-MCNC: 1 MG/DL (ref 0.6–1.3)
GLOBULIN SER CALC-MCNC: 3.3 G/DL (ref 2–4)
GLUCOSE SERPL-MCNC: 92 MG/DL (ref 74–99)
LITHIUM SERPL-SCNC: 0.4 MMOL/L (ref 0.6–1.2)
POTASSIUM SERPL-SCNC: 4.8 MMOL/L (ref 3.5–5.5)
PROT SERPL-MCNC: 7.9 G/DL (ref 6.4–8.2)
SODIUM SERPL-SCNC: 139 MMOL/L (ref 136–145)
T3FREE SERPL-MCNC: 3 PG/ML (ref 2.18–3.98)
TSH SERPL DL<=0.05 MIU/L-ACNC: 2.2 UIU/ML (ref 0.36–3.74)

## 2020-12-21 PROCEDURE — 84443 ASSAY THYROID STIM HORMONE: CPT

## 2020-12-21 PROCEDURE — 80053 COMPREHEN METABOLIC PANEL: CPT

## 2020-12-21 PROCEDURE — 36415 COLL VENOUS BLD VENIPUNCTURE: CPT

## 2020-12-21 PROCEDURE — 80178 ASSAY OF LITHIUM: CPT

## 2020-12-21 PROCEDURE — 84481 FREE ASSAY (FT-3): CPT

## 2022-03-18 PROBLEM — F99 PSYCHIATRIC ILLNESS: Status: ACTIVE | Noted: 2017-10-13

## 2022-03-19 PROBLEM — I10 HYPERTENSION: Status: ACTIVE | Noted: 2017-10-13

## 2022-03-20 PROBLEM — K92.2 GI BLEED: Status: ACTIVE | Noted: 2017-10-13

## 2025-02-20 SDOH — HEALTH STABILITY: PHYSICAL HEALTH: ON AVERAGE, HOW MANY DAYS PER WEEK DO YOU ENGAGE IN MODERATE TO STRENUOUS EXERCISE (LIKE A BRISK WALK)?: 0 DAYS

## 2025-02-20 SDOH — HEALTH STABILITY: PHYSICAL HEALTH: ON AVERAGE, HOW MANY MINUTES DO YOU ENGAGE IN EXERCISE AT THIS LEVEL?: 0 MIN

## 2025-03-04 ENCOUNTER — HOSPITAL ENCOUNTER (OUTPATIENT)
Facility: HOSPITAL | Age: 35
Setting detail: SPECIMEN
Discharge: HOME | End: 2025-03-07
Payer: COMMERCIAL

## 2025-03-04 ENCOUNTER — OFFICE VISIT (OUTPATIENT)
Facility: CLINIC | Age: 35
End: 2025-03-04
Payer: COMMERCIAL

## 2025-03-04 VITALS
DIASTOLIC BLOOD PRESSURE: 64 MMHG | HEART RATE: 86 BPM | SYSTOLIC BLOOD PRESSURE: 95 MMHG | HEIGHT: 71 IN | RESPIRATION RATE: 17 BRPM | BODY MASS INDEX: 32.73 KG/M2 | OXYGEN SATURATION: 94 % | TEMPERATURE: 97.3 F | WEIGHT: 233.8 LBS

## 2025-03-04 DIAGNOSIS — E66.811 OBESITY (BMI 30.0-34.9): ICD-10-CM

## 2025-03-04 DIAGNOSIS — R73.03 PRE-DIABETES: ICD-10-CM

## 2025-03-04 DIAGNOSIS — R79.89 ELEVATED LFTS: Primary | ICD-10-CM

## 2025-03-04 DIAGNOSIS — L70.9 ACNE, UNSPECIFIED ACNE TYPE: ICD-10-CM

## 2025-03-04 DIAGNOSIS — R79.89 ELEVATED LFTS: ICD-10-CM

## 2025-03-04 DIAGNOSIS — R14.3 FLATULENCE: ICD-10-CM

## 2025-03-04 LAB
CHOLEST SERPL-MCNC: 242 MG/DL
FERRITIN SERPL-MCNC: 60 NG/ML (ref 8–388)
HBA1C MFR BLD: 5.6 %
HDLC SERPL-MCNC: 35 MG/DL (ref 40–60)
HDLC SERPL: 6.9 (ref 0–5)
LDLC SERPL CALC-MCNC: 144.4 MG/DL (ref 0–100)
LIPID PANEL: ABNORMAL
T4 FREE SERPL-MCNC: 1.2 NG/DL (ref 0.7–1.5)
TRIGL SERPL-MCNC: 313 MG/DL
TSH SERPL DL<=0.05 MIU/L-ACNC: 3.03 UIU/ML (ref 0.36–3.74)
VLDLC SERPL CALC-MCNC: 62.6 MG/DL

## 2025-03-04 PROCEDURE — 82728 ASSAY OF FERRITIN: CPT

## 2025-03-04 PROCEDURE — 80061 LIPID PANEL: CPT

## 2025-03-04 PROCEDURE — 86231 EMA EACH IG CLASS: CPT

## 2025-03-04 PROCEDURE — 84443 ASSAY THYROID STIM HORMONE: CPT

## 2025-03-04 PROCEDURE — 86381 MITOCHONDRIAL ANTIBODY EACH: CPT

## 2025-03-04 PROCEDURE — 86706 HEP B SURFACE ANTIBODY: CPT

## 2025-03-04 PROCEDURE — 99203 OFFICE O/P NEW LOW 30 MIN: CPT | Performed by: INTERNAL MEDICINE

## 2025-03-04 PROCEDURE — 36415 COLL VENOUS BLD VENIPUNCTURE: CPT

## 2025-03-04 PROCEDURE — 82784 ASSAY IGA/IGD/IGG/IGM EACH: CPT

## 2025-03-04 PROCEDURE — 86364 TISS TRNSGLTMNASE EA IG CLAS: CPT

## 2025-03-04 PROCEDURE — 86803 HEPATITIS C AB TEST: CPT

## 2025-03-04 PROCEDURE — 3074F SYST BP LT 130 MM HG: CPT | Performed by: INTERNAL MEDICINE

## 2025-03-04 PROCEDURE — 82390 ASSAY OF CERULOPLASMIN: CPT

## 2025-03-04 PROCEDURE — 3078F DIAST BP <80 MM HG: CPT | Performed by: INTERNAL MEDICINE

## 2025-03-04 PROCEDURE — 83036 HEMOGLOBIN GLYCOSYLATED A1C: CPT | Performed by: INTERNAL MEDICINE

## 2025-03-04 PROCEDURE — 87340 HEPATITIS B SURFACE AG IA: CPT

## 2025-03-04 PROCEDURE — 84439 ASSAY OF FREE THYROXINE: CPT

## 2025-03-04 PROCEDURE — 86015 ACTIN ANTIBODY EACH: CPT

## 2025-03-04 PROCEDURE — 82103 ALPHA-1-ANTITRYPSIN TOTAL: CPT

## 2025-03-04 RX ORDER — CLONAZEPAM 0.5 MG/1
0.5 TABLET ORAL 3 TIMES DAILY PRN
COMMUNITY
Start: 2025-03-03

## 2025-03-04 RX ORDER — BUSPIRONE HYDROCHLORIDE 30 MG/1
30 TABLET ORAL DAILY
COMMUNITY
Start: 2025-03-03

## 2025-03-04 RX ORDER — LITHIUM CARBONATE 300 MG/1
600 CAPSULE ORAL DAILY
COMMUNITY
Start: 2025-03-03

## 2025-03-04 RX ORDER — BUPROPION HYDROCHLORIDE 300 MG/1
300 TABLET ORAL EVERY MORNING
COMMUNITY
Start: 2025-03-03

## 2025-03-04 RX ORDER — VILAZODONE HYDROCHLORIDE 40 MG/1
40 TABLET ORAL DAILY
COMMUNITY
Start: 2025-03-03

## 2025-03-04 RX ORDER — PROPRANOLOL HYDROCHLORIDE 40 MG/1
40 TABLET ORAL 2 TIMES DAILY
COMMUNITY
Start: 2025-03-03

## 2025-03-04 RX ORDER — TRAZODONE HYDROCHLORIDE 100 MG/1
100 TABLET ORAL NIGHTLY
COMMUNITY
Start: 2025-03-03

## 2025-03-04 RX ORDER — SIMETHICONE 80 MG
80 TABLET,CHEWABLE ORAL 2 TIMES DAILY PRN
Qty: 60 TABLET | Refills: 2 | Status: SHIPPED | OUTPATIENT
Start: 2025-03-04

## 2025-03-04 SDOH — ECONOMIC STABILITY: FOOD INSECURITY: WITHIN THE PAST 12 MONTHS, THE FOOD YOU BOUGHT JUST DIDN'T LAST AND YOU DIDN'T HAVE MONEY TO GET MORE.: NEVER TRUE

## 2025-03-04 SDOH — ECONOMIC STABILITY: FOOD INSECURITY: WITHIN THE PAST 12 MONTHS, YOU WORRIED THAT YOUR FOOD WOULD RUN OUT BEFORE YOU GOT MONEY TO BUY MORE.: NEVER TRUE

## 2025-03-04 ASSESSMENT — PATIENT HEALTH QUESTIONNAIRE - PHQ9
SUM OF ALL RESPONSES TO PHQ QUESTIONS 1-9: 1
2. FEELING DOWN, DEPRESSED OR HOPELESS: NOT AT ALL
SUM OF ALL RESPONSES TO PHQ QUESTIONS 1-9: 1
1. LITTLE INTEREST OR PLEASURE IN DOING THINGS: SEVERAL DAYS
SUM OF ALL RESPONSES TO PHQ QUESTIONS 1-9: 1
SUM OF ALL RESPONSES TO PHQ QUESTIONS 1-9: 1

## 2025-03-04 ASSESSMENT — ENCOUNTER SYMPTOMS
ABDOMINAL PAIN: 0
SHORTNESS OF BREATH: 0

## 2025-03-04 NOTE — PROGRESS NOTES
of breath.    Cardiovascular:  Negative for chest pain.   Gastrointestinal:  Negative for abdominal pain.       Objective:   Visit Vitals  BP 95/64 (Site: Right Upper Arm, Position: Sitting)   Pulse 86   Temp 97.3 °F (36.3 °C) (Temporal)   Resp 17   Ht 1.803 m (5' 11\")   Wt 106.1 kg (233 lb 12.8 oz)   SpO2 94%   BMI 32.61 kg/m²        Physical Exam  HENT:      Mouth/Throat:      Lips: Pink.      Mouth: Mucous membranes are moist.      Pharynx: Oropharynx is clear.      Tonsils: No tonsillar exudate or tonsillar abscesses.   Cardiovascular:      Rate and Rhythm: Normal rate and regular rhythm.      Heart sounds: S1 normal and S2 normal.      No S3 or S4 sounds.   Pulmonary:      Effort: Pulmonary effort is normal.      Breath sounds: Normal breath sounds.      Comments: No crackles or wheezing.  Abdominal:      General: Abdomen is flat. Bowel sounds are normal.   Musculoskeletal:      Right lower leg: No edema.      Left lower leg: No edema.   Neurological:      Mental Status: He is alert.         Assessment:       ICD-10-CM    1. Elevated LFTs  R79.89 US LIVER     Celiac Disease Panel     Mitochondrial antibodies, M2     TSH + Free T4 Panel     Alpha-1-Antitrypsin     Ceruloplasmin     Anti-smooth muscle antibody     Ferritin     Hepatitis C Ab, Rflx to Qt by PCR     Hepatitis B Surface Antigen     Hepatitis B Surface Antibody      2. Obesity (BMI 30.0-34.9)  E66.811 Lipid Panel      3. Pre-diabetes  R73.03 AMB POC HEMOGLOBIN A1C     Lipid Panel      4. Flatulence  R14.3 simethicone (MYLICON) 80 MG chewable tablet      5. Acne, unspecified acne type  L70.9 External Referral To Dermatology              Plan:   Return in about 3 months (around 6/4/2025) for Annual exam or before as needed.      William León MD

## 2025-03-04 NOTE — PATIENT INSTRUCTIONS
Call to schedule ultrasound liver [phone]   Pt here for colonoscopy 2/2 rectal bleeding and diarrhea

## 2025-03-05 LAB
HBV SURFACE AB SER QL IA: NEGATIVE
HBV SURFACE AB SERPL IA-ACNC: <3.1 MIU/ML
HBV SURFACE AG SER QL: 0.15 INDEX
HBV SURFACE AG SER QL: NEGATIVE
HEP BS AB COMMENT: ABNORMAL

## 2025-03-06 LAB
HCV AB SERPL QL IA: NORMAL
HCV IGG SERPL QL IA: NON REACTIVE S/CO RATIO

## 2025-03-07 LAB
MITOCHONDRIA M2 IGG SER-ACNC: <20 UNITS (ref 0–20)
SMA IGG SER-ACNC: 3 UNITS (ref 0–19)

## 2025-03-08 ENCOUNTER — RESULTS FOLLOW-UP (OUTPATIENT)
Facility: HOSPITAL | Age: 35
End: 2025-03-08

## 2025-03-08 LAB
A1AT SERPL-MCNC: 138 MG/DL (ref 95–164)
CERULOPLASMIN SERPL-MCNC: 30.6 MG/DL (ref 16–31)
ENDOMYSIUM IGA SER QL: NEGATIVE
IGA SERPL-MCNC: 107 MG/DL (ref 90–386)
TTG IGA SER-ACNC: <2 U/ML (ref 0–3)

## 2025-03-11 ENCOUNTER — HOSPITAL ENCOUNTER (OUTPATIENT)
Facility: HOSPITAL | Age: 35
Discharge: HOME | End: 2025-03-14
Payer: COMMERCIAL

## 2025-03-11 DIAGNOSIS — R79.89 ELEVATED LFTS: ICD-10-CM

## 2025-03-11 PROCEDURE — 76705 ECHO EXAM OF ABDOMEN: CPT

## 2025-03-16 ENCOUNTER — RESULTS FOLLOW-UP (OUTPATIENT)
Facility: HOSPITAL | Age: 35
End: 2025-03-16

## 2025-06-10 ENCOUNTER — OFFICE VISIT (OUTPATIENT)
Facility: CLINIC | Age: 35
End: 2025-06-10
Payer: MEDICARE

## 2025-06-10 VITALS
WEIGHT: 230.4 LBS | TEMPERATURE: 97.2 F | OXYGEN SATURATION: 91 % | SYSTOLIC BLOOD PRESSURE: 121 MMHG | BODY MASS INDEX: 32.26 KG/M2 | RESPIRATION RATE: 17 BRPM | DIASTOLIC BLOOD PRESSURE: 78 MMHG | HEIGHT: 71 IN | HEART RATE: 86 BPM

## 2025-06-10 DIAGNOSIS — Z11.4 SCREENING FOR HIV (HUMAN IMMUNODEFICIENCY VIRUS): ICD-10-CM

## 2025-06-10 DIAGNOSIS — J30.9 CHRONIC ALLERGIC RHINITIS: ICD-10-CM

## 2025-06-10 DIAGNOSIS — H61.23 BILATERAL IMPACTED CERUMEN: ICD-10-CM

## 2025-06-10 DIAGNOSIS — F31.9 BIPOLAR AFFECTIVE DISORDER, REMISSION STATUS UNSPECIFIED (HCC): ICD-10-CM

## 2025-06-10 DIAGNOSIS — K92.1 BLOOD IN FECES: ICD-10-CM

## 2025-06-10 DIAGNOSIS — K64.9 HEMORRHOIDS, UNSPECIFIED HEMORRHOID TYPE: ICD-10-CM

## 2025-06-10 DIAGNOSIS — Z71.89 ACP (ADVANCE CARE PLANNING): ICD-10-CM

## 2025-06-10 DIAGNOSIS — Z00.00 INITIAL MEDICARE ANNUAL WELLNESS VISIT: Primary | ICD-10-CM

## 2025-06-10 PROCEDURE — 3074F SYST BP LT 130 MM HG: CPT | Performed by: INTERNAL MEDICINE

## 2025-06-10 PROCEDURE — 99497 ADVNCD CARE PLAN 30 MIN: CPT | Performed by: INTERNAL MEDICINE

## 2025-06-10 PROCEDURE — 99213 OFFICE O/P EST LOW 20 MIN: CPT | Performed by: INTERNAL MEDICINE

## 2025-06-10 PROCEDURE — 3078F DIAST BP <80 MM HG: CPT | Performed by: INTERNAL MEDICINE

## 2025-06-10 PROCEDURE — G0438 PPPS, INITIAL VISIT: HCPCS | Performed by: INTERNAL MEDICINE

## 2025-06-10 ASSESSMENT — PATIENT HEALTH QUESTIONNAIRE - PHQ9
1. LITTLE INTEREST OR PLEASURE IN DOING THINGS: NOT AT ALL
2. FEELING DOWN, DEPRESSED OR HOPELESS: NOT AT ALL
SUM OF ALL RESPONSES TO PHQ QUESTIONS 1-9: 0

## 2025-06-10 ASSESSMENT — LIFESTYLE VARIABLES
HOW OFTEN DO YOU HAVE A DRINK CONTAINING ALCOHOL: NEVER
HOW MANY STANDARD DRINKS CONTAINING ALCOHOL DO YOU HAVE ON A TYPICAL DAY: PATIENT DOES NOT DRINK

## 2025-06-10 NOTE — PROGRESS NOTES
Have you been to the ER, urgent care clinic since your last visit?  Hospitalized since your last visit?   NO    Have you seen or consulted any other health care providers outside our system since your last visit?   NO         
Medicare Annual Wellness Visit    Facundo Loredo is here for Follow-up and Medicare AWV    Assessment & Plan   ACP (advance care planning)  -     [place] Advanced Care Planning (16-30 minutes) [65957]  Initial Medicare annual wellness visit     No follow-ups on file.     Subjective       Patient's complete Health Risk Assessment and screening values have been reviewed and are found in Flowsheets. The following problems were reviewed today and where indicated follow up appointments were made and/or referrals ordered.    Positive Risk Factor Screenings with Interventions:    Fall Risk:  Do you feel unsteady or are you worried about falling? : no  2 or more falls in past year?: (!) yes  Fall with injury in past year?: (!) yes     Interventions:    Reviewed medications, home hazards, visual acuity, and co-morbidities that can increase risk for falls  See AVS for additional education material             Inactivity:  On average, how many days per week do you engage in moderate to strenuous exercise (like a brisk walk)?: 0 days (!) Abnormal  On average, how many minutes do you engage in exercise at this level?: 0 min  Interventions:  See AVS for additional education material     Abnormal BMI (obese):  Body mass index is 32.13 kg/m². (!) Abnormal  Interventions:  See AVS for additional education material           Safety:  Do you have working smoke detectors?: (!) No  Do you have any tripping hazards - loose or unsecured carpets or rugs?: (!) Yes  Do you have non-slip mats or non-slip surfaces or shower bars or grab bars in your shower or bathtub?: (!) No  Interventions:  See AVS for additional education material     Advanced Directives:  Do you have a Living Will?: (!) No    Intervention:  has NO advanced directive - information provided    Advance Care Planning   Discussed the patient’s choices for care and treatment preferences in case of a health event that adversely affects decision-making abilities or is life-limiting. 
Cuff Size: Large Adult)   Pulse 86   Temp 97.2 °F (36.2 °C) (Temporal)   Resp 17   Ht 1.803 m (5' 11\")   Wt 104.5 kg (230 lb 6.4 oz)   SpO2 91%   BMI 32.13 kg/m²        Physical Exam  Cardiovascular:      Rate and Rhythm: Normal rate and regular rhythm.      Heart sounds: S1 normal and S2 normal.      No S3 or S4 sounds.   Pulmonary:      Effort: Pulmonary effort is normal.      Breath sounds: Normal breath sounds.      Comments: No crackles or wheezing.  Abdominal:      General: Abdomen is flat. Bowel sounds are normal.   Musculoskeletal:      Right lower leg: No edema.      Left lower leg: No edema.   Neurological:      Mental Status: He is alert.         Assessment:       ICD-10-CM    1. Initial Medicare annual wellness visit  Z00.00       2. ACP (advance care planning)  Z71.89 [place] Advanced Care Planning (16-30 minutes) [87089]      3. Bipolar affective disorder, remission status unspecified (HCC)  F31.9 Lithium Level     Comprehensive Metabolic Panel     Comprehensive Metabolic Panel     Lithium Level      4. Bilateral impacted cerumen  H61.23 Remove impacted ear wax      5. Blood in feces  K92.1 Occult Blood Stool Immunoassay     CBC with Auto Differential     CBC with Auto Differential     Occult Blood Stool Immunoassay      6. Chronic allergic rhinitis  J30.9 External Referral To Allergy      7. Screening for HIV (human immunodeficiency virus)  Z11.4 HIV 1/2 Ag/Ab, 4TH Generation,W Rflx Confirm     HIV 1/2 Ag/Ab, 4TH Generation,W Rflx Confirm      8. Hemorrhoids, unspecified hemorrhoid type  K64.9               Plan:   Return in about 1 year (around 6/10/2026) for Annual exam or before as needed.      William León MD

## 2025-06-11 LAB
ALBUMIN SERPL-MCNC: 4.8 G/DL (ref 4.1–5.1)
ALP SERPL-CCNC: 105 IU/L (ref 44–121)
ALT SERPL-CCNC: 54 IU/L (ref 0–44)
AST SERPL-CCNC: 33 IU/L (ref 0–40)
BASOPHILS # BLD AUTO: 0.1 X10E3/UL (ref 0–0.2)
BASOPHILS NFR BLD AUTO: 1 %
BILIRUB SERPL-MCNC: 0.3 MG/DL (ref 0–1.2)
BUN SERPL-MCNC: 10 MG/DL (ref 6–20)
BUN/CREAT SERPL: 14 (ref 9–20)
CALCIUM SERPL-MCNC: 10.1 MG/DL (ref 8.7–10.2)
CHLORIDE SERPL-SCNC: 99 MMOL/L (ref 96–106)
CO2 SERPL-SCNC: 17 MMOL/L (ref 20–29)
CREAT SERPL-MCNC: 0.71 MG/DL (ref 0.76–1.27)
EGFRCR SERPLBLD CKD-EPI 2021: 123 ML/MIN/1.73
EOSINOPHIL # BLD AUTO: 0.3 X10E3/UL (ref 0–0.4)
EOSINOPHIL NFR BLD AUTO: 3 %
ERYTHROCYTE [DISTWIDTH] IN BLOOD BY AUTOMATED COUNT: 12.8 % (ref 11.6–15.4)
GLOBULIN SER CALC-MCNC: 2.8 G/DL (ref 1.5–4.5)
GLUCOSE SERPL-MCNC: 48 MG/DL (ref 70–99)
HCT VFR BLD AUTO: 48.2 % (ref 37.5–51)
HGB BLD-MCNC: 16.3 G/DL (ref 13–17.7)
HIV 1+2 AB+HIV1 P24 AG SERPL QL IA: NON REACTIVE
IMM GRANULOCYTES # BLD AUTO: 0.1 X10E3/UL (ref 0–0.1)
IMM GRANULOCYTES NFR BLD AUTO: 1 %
LITHIUM SERPL-SCNC: 0.3 MMOL/L (ref 0.5–1.2)
LYMPHOCYTES # BLD AUTO: 3.4 X10E3/UL (ref 0.7–3.1)
LYMPHOCYTES NFR BLD AUTO: 32 %
MCH RBC QN AUTO: 29.5 PG (ref 26.6–33)
MCHC RBC AUTO-ENTMCNC: 33.8 G/DL (ref 31.5–35.7)
MCV RBC AUTO: 87 FL (ref 79–97)
MONOCYTES # BLD AUTO: 0.9 X10E3/UL (ref 0.1–0.9)
MONOCYTES NFR BLD AUTO: 8 %
NEUTROPHILS # BLD AUTO: 5.8 X10E3/UL (ref 1.4–7)
NEUTROPHILS NFR BLD AUTO: 55 %
PLATELET # BLD AUTO: 474 X10E3/UL (ref 150–450)
POTASSIUM SERPL-SCNC: 4.1 MMOL/L (ref 3.5–5.2)
PROT SERPL-MCNC: 7.6 G/DL (ref 6–8.5)
RBC # BLD AUTO: 5.52 X10E6/UL (ref 4.14–5.8)
SODIUM SERPL-SCNC: 143 MMOL/L (ref 134–144)
WBC # BLD AUTO: 10.4 X10E3/UL (ref 3.4–10.8)

## 2025-06-12 ASSESSMENT — ENCOUNTER SYMPTOMS
ABDOMINAL PAIN: 0
SHORTNESS OF BREATH: 0

## 2025-06-15 ENCOUNTER — RESULTS FOLLOW-UP (OUTPATIENT)
Facility: CLINIC | Age: 35
End: 2025-06-15

## 2025-06-15 DIAGNOSIS — K92.1 BLOOD IN THE STOOL: Primary | ICD-10-CM

## 2025-06-24 LAB — HEMOCCULT STL QL IA: POSITIVE

## (undated) DEVICE — FLEX ADVANTAGE 1500CC: Brand: FLEX ADVANTAGE

## (undated) DEVICE — MEDI-VAC NON-CONDUCTIVE SUCTION TUBING: Brand: CARDINAL HEALTH

## (undated) DEVICE — KIT COLON W/ 1.1OZ LUB AND 2 END

## (undated) DEVICE — Device

## (undated) DEVICE — BITE BLK ENDOSCP AD 54FR GRN POLYETH ENDOSCP W STRP SLD

## (undated) DEVICE — BASIN EMESIS 500CC ROSE 250/CS 60/PLT: Brand: MEDEGEN MEDICAL PRODUCTS, LLC

## (undated) DEVICE — KENDALL 500 SERIES DIAPHORETIC FOAM MONITORING ELECTRODE - TEAR DROP SHAPE ( 30/PK): Brand: KENDALL

## (undated) DEVICE — SYR 50ML SLIP TIP NSAF LF STRL --